# Patient Record
Sex: FEMALE | Race: WHITE | NOT HISPANIC OR LATINO | Employment: UNEMPLOYED | URBAN - METROPOLITAN AREA
[De-identification: names, ages, dates, MRNs, and addresses within clinical notes are randomized per-mention and may not be internally consistent; named-entity substitution may affect disease eponyms.]

---
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
## 2019-01-03 ENCOUNTER — OFFICE VISIT (OUTPATIENT)
Dept: OBSTETRICS AND GYNECOLOGY | Facility: CLINIC | Age: 19
End: 2019-01-03
Payer: COMMERCIAL

## 2019-01-03 VITALS
SYSTOLIC BLOOD PRESSURE: 100 MMHG | DIASTOLIC BLOOD PRESSURE: 56 MMHG | WEIGHT: 101 LBS | BODY MASS INDEX: 20.36 KG/M2 | HEIGHT: 59 IN

## 2019-01-03 DIAGNOSIS — R30.0 DYSURIA: ICD-10-CM

## 2019-01-03 DIAGNOSIS — B96.89 BV (BACTERIAL VAGINOSIS): Primary | ICD-10-CM

## 2019-01-03 DIAGNOSIS — N76.0 BV (BACTERIAL VAGINOSIS): Primary | ICD-10-CM

## 2019-01-03 PROCEDURE — 99213 OFFICE O/P EST LOW 20 MIN: CPT | Mod: S$GLB,,, | Performed by: NURSE PRACTITIONER

## 2019-01-03 PROCEDURE — 87480 CANDIDA DNA DIR PROBE: CPT

## 2019-01-03 PROCEDURE — 87510 GARDNER VAG DNA DIR PROBE: CPT

## 2019-01-03 PROCEDURE — 3008F BODY MASS INDEX DOCD: CPT | Mod: CPTII,S$GLB,, | Performed by: NURSE PRACTITIONER

## 2019-01-03 PROCEDURE — 87086 URINE CULTURE/COLONY COUNT: CPT

## 2019-01-03 PROCEDURE — 87088 URINE BACTERIA CULTURE: CPT

## 2019-01-03 PROCEDURE — 99213 PR OFFICE/OUTPT VISIT, EST, LEVL III, 20-29 MIN: ICD-10-PCS | Mod: S$GLB,,, | Performed by: NURSE PRACTITIONER

## 2019-01-03 PROCEDURE — 87147 CULTURE TYPE IMMUNOLOGIC: CPT

## 2019-01-03 PROCEDURE — 99999 PR PBB SHADOW E&M-EST. PATIENT-LVL III: CPT | Mod: PBBFAC,,, | Performed by: NURSE PRACTITIONER

## 2019-01-03 PROCEDURE — 99999 PR PBB SHADOW E&M-EST. PATIENT-LVL III: ICD-10-PCS | Mod: PBBFAC,,, | Performed by: NURSE PRACTITIONER

## 2019-01-03 PROCEDURE — 87491 CHLMYD TRACH DNA AMP PROBE: CPT

## 2019-01-03 PROCEDURE — 3008F PR BODY MASS INDEX (BMI) DOCUMENTED: ICD-10-PCS | Mod: CPTII,S$GLB,, | Performed by: NURSE PRACTITIONER

## 2019-01-03 NOTE — PROGRESS NOTES
"CC: Dysuria and vaginal discharge    Ruslan Perez is a 18 y.o. female  presents dysuria and vaginal discharge.Urine in clinic indicated trace Leucocytes. Patient was treated for BV 2 weeks ago.  LMP: Patient's last menstrual period was 12/10/2018..  No pelvic pain. Patient " has unprotected sex with a new partner ".     Past Medical History:   Diagnosis Date    ADHD (attention deficit hyperactivity disorder)     Anxiety     Depression     History of chlamydia     Lactose intolerance     Migraine headache     Renal disorder     kidney stones    TIA (transient ischemic attack)      Past Surgical History:   Procedure Laterality Date    blood vessel removed from chest      kidney stent      Mirena      placed 2 years ago     Social History     Socioeconomic History    Marital status: Single     Spouse name: Not on file    Number of children: Not on file    Years of education: Not on file    Highest education level: Not on file   Social Needs    Financial resource strain: Not on file    Food insecurity - worry: Not on file    Food insecurity - inability: Not on file    Transportation needs - medical: Not on file    Transportation needs - non-medical: Not on file   Occupational History    Not on file   Tobacco Use    Smoking status: Current Every Day Smoker     Packs/day: 0.50     Types: Cigarettes    Smokeless tobacco: Current User   Substance and Sexual Activity    Alcohol use: No    Drug use: No    Sexual activity: Yes     Partners: Male     Birth control/protection: None   Other Topics Concern    Not on file   Social History Narrative    Not on file     Family History   Problem Relation Age of Onset    Breast cancer Paternal Grandmother     Breast cancer Paternal Aunt     Colon cancer Neg Hx     Ovarian cancer Neg Hx      OB History      Para Term  AB Living    1 1 1 0 0 1    SAB TAB Ectopic Multiple Live Births    0 0 0 0 1          BP (!) 100/56   Ht 4' " "11" (1.499 m)   Wt 45.8 kg (100 lb 15.5 oz)   LMP 12/10/2018   BMI 20.39 kg/m²       ROS:  CARDIOVASCULAR: Denies palpitations or left sided chest pain.   ABDOMEN: No abdominal pain, constipation, diarrhea, nausea, vomiting or rectal bleeding.   URINARY: HPI  REPRODUCTIVE: See HPI.       PHYSICAL EXAM:  APPEARANCE: Well nourished, well developed, in no acute distress.  CHEST: Good respiratory effect  ABDOMEN: Soft.  No tenderness or masses.  No hepatosplenomegaly.  No hernias.  PELVIC: Normal external genitalia without lesions.   Vagina without lesions or discharge.     1. BV (bacterial vaginosis)  C. trachomatis/N. gonorrhoeae by AMP DNA    Vaginosis Screen by DNA Probe   2. Dysuria  Urine culture     PLAN:  Repeat cx  Urine sent for cx    Patient was counseled today on A.C.S. Pap guidelines and recommendations for yearly pelvic exams, mammograms and monthly self breast exams; to see her PCP for other health maintenance.                   "

## 2019-01-03 NOTE — PATIENT INSTRUCTIONS

## 2019-01-04 ENCOUNTER — TELEPHONE (OUTPATIENT)
Dept: OBSTETRICS AND GYNECOLOGY | Facility: CLINIC | Age: 19
End: 2019-01-04

## 2019-01-04 LAB
C TRACH DNA SPEC QL NAA+PROBE: NOT DETECTED
CANDIDA RRNA VAG QL PROBE: NEGATIVE
G VAGINALIS RRNA GENITAL QL PROBE: POSITIVE
N GONORRHOEA DNA SPEC QL NAA+PROBE: NOT DETECTED
T VAGINALIS RRNA GENITAL QL PROBE: NEGATIVE

## 2019-01-04 RX ORDER — CLINDAMYCIN HYDROCHLORIDE 300 MG/1
300 CAPSULE ORAL EVERY 8 HOURS
Qty: 21 CAPSULE | Refills: 0 | Status: SHIPPED | OUTPATIENT
Start: 2019-01-04 | End: 2019-01-11

## 2019-01-04 NOTE — TELEPHONE ENCOUNTER
Spoke with pt  Notified of vaginosis screen, medication instructions give. Gonorrhea and Chlamydia testing is negative. Urine culture is in process. Patient verbalized understanding

## 2019-01-04 NOTE — TELEPHONE ENCOUNTER
----- Message from Aissatou Mccormack NP sent at 1/4/2019  7:49 AM CST -----  Please call patient and inform her that cx indicated BV. I called in [place].

## 2019-01-05 LAB — BACTERIA UR CULT: NORMAL

## 2019-01-10 ENCOUNTER — TELEPHONE (OUTPATIENT)
Dept: OBSTETRICS AND GYNECOLOGY | Facility: CLINIC | Age: 19
End: 2019-01-10

## 2019-01-10 RX ORDER — PROMETHAZINE HYDROCHLORIDE 25 MG/1
25 TABLET ORAL EVERY 6 HOURS PRN
Qty: 8 TABLET | Refills: 0 | Status: SHIPPED | OUTPATIENT
Start: 2019-01-10 | End: 2019-01-13

## 2019-01-10 NOTE — TELEPHONE ENCOUNTER
----- Message from Gladys Munguia sent at 1/10/2019  9:00 AM CST -----  Pt is requesting a prescription for nausea due to the antibiotics she is taking.          Please call pt back at [phone]          [id number] - [place] - [address] AT [facility]  [address]  [place] [address]  Phone: [phone] Fax: [phone]

## 2019-08-06 ENCOUNTER — OFFICE VISIT (OUTPATIENT)
Dept: OBSTETRICS AND GYNECOLOGY | Facility: CLINIC | Age: 19
End: 2019-08-06
Payer: COMMERCIAL

## 2019-08-06 ENCOUNTER — LAB VISIT (OUTPATIENT)
Dept: LAB | Facility: HOSPITAL | Age: 19
End: 2019-08-06
Attending: OBSTETRICS & GYNECOLOGY
Payer: COMMERCIAL

## 2019-08-06 VITALS
SYSTOLIC BLOOD PRESSURE: 98 MMHG | DIASTOLIC BLOOD PRESSURE: 62 MMHG | HEIGHT: 59 IN | BODY MASS INDEX: 20.89 KG/M2 | WEIGHT: 103.63 LBS

## 2019-08-06 DIAGNOSIS — Z11.3 SCREEN FOR STD (SEXUALLY TRANSMITTED DISEASE): ICD-10-CM

## 2019-08-06 DIAGNOSIS — Z11.3 SCREEN FOR STD (SEXUALLY TRANSMITTED DISEASE): Primary | ICD-10-CM

## 2019-08-06 PROBLEM — F17.200 TOBACCO DEPENDENCE SYNDROME: Status: ACTIVE | Noted: 2018-05-01

## 2019-08-06 PROBLEM — F32.A DEPRESSIVE DISORDER: Status: ACTIVE | Noted: 2018-05-01

## 2019-08-06 PROCEDURE — 3008F PR BODY MASS INDEX (BMI) DOCUMENTED: ICD-10-PCS | Mod: CPTII,S$GLB,, | Performed by: OBSTETRICS & GYNECOLOGY

## 2019-08-06 PROCEDURE — 99999 PR PBB SHADOW E&M-EST. PATIENT-LVL II: CPT | Mod: PBBFAC,,, | Performed by: OBSTETRICS & GYNECOLOGY

## 2019-08-06 PROCEDURE — 99999 PR PBB SHADOW E&M-EST. PATIENT-LVL II: ICD-10-PCS | Mod: PBBFAC,,, | Performed by: OBSTETRICS & GYNECOLOGY

## 2019-08-06 PROCEDURE — 87210 SMEAR WET MOUNT SALINE/INK: CPT | Mod: QW,S$GLB,, | Performed by: OBSTETRICS & GYNECOLOGY

## 2019-08-06 PROCEDURE — 86703 HIV-1/HIV-2 1 RESULT ANTBDY: CPT

## 2019-08-06 PROCEDURE — 86592 SYPHILIS TEST NON-TREP QUAL: CPT

## 2019-08-06 PROCEDURE — 36415 COLL VENOUS BLD VENIPUNCTURE: CPT

## 2019-08-06 PROCEDURE — 99213 PR OFFICE/OUTPT VISIT, EST, LEVL III, 20-29 MIN: ICD-10-PCS | Mod: S$GLB,,, | Performed by: OBSTETRICS & GYNECOLOGY

## 2019-08-06 PROCEDURE — 87491 CHLMYD TRACH DNA AMP PROBE: CPT

## 2019-08-06 PROCEDURE — 3008F BODY MASS INDEX DOCD: CPT | Mod: CPTII,S$GLB,, | Performed by: OBSTETRICS & GYNECOLOGY

## 2019-08-06 PROCEDURE — 99213 OFFICE O/P EST LOW 20 MIN: CPT | Mod: S$GLB,,, | Performed by: OBSTETRICS & GYNECOLOGY

## 2019-08-06 PROCEDURE — 87210 PR  SMEAR,STAIN,WET MNT,INTERP: ICD-10-PCS | Mod: QW,S$GLB,, | Performed by: OBSTETRICS & GYNECOLOGY

## 2019-08-06 PROCEDURE — 80074 ACUTE HEPATITIS PANEL: CPT

## 2019-08-06 NOTE — PROGRESS NOTES
Subjective:       Patient ID: Ruslan Perez is a 19 y.o. female.    Chief Complaint:  STD CHECK      History of Present Illness  HPI  Unprotected intercourse.   Concerned about std exposure, partner with no history   Also with some concern about change in vaginal discharge.  No other symptoms from the discharge.   History of BV in the past   No recent positive std     Health Maintenance   Topic Date Due    Lipid Panel  1999    Pneumococcal Vaccine (Medium Risk) (1 of 1 - PPSV23) 2018    CHLAMYDIA SCREENING  2020    TETANUS VACCINE  2021    HPV Vaccines  Completed     GYN & OB History  Patient's last menstrual period was 2019.   Date of Last Pap: No result found    OB History    Para Term  AB Living   1 1 1 0 0 1   SAB TAB Ectopic Multiple Live Births   0 0 0 0 1      # Outcome Date GA Lbr Marcel/2nd Weight Sex Delivery Anes PTL Lv   1 Term 17    M Vag-Spont EPI N JONATAN       Review of Systems  Review of Systems        Objective:   Physical Exam:             Abdominal: Soft. Bowel sounds are normal. She exhibits no distension, no mass and no abdominal incision. There is no tenderness. There is no guarding. No hernia. Hernia confirmed negative in the right inguinal area and confirmed negative in the left inguinal area.     Genitourinary: Rectum normal, vagina normal and uterus normal. There is no rash, tenderness or lesion on the right labia. There is no rash, tenderness or lesion on the left labia. Uterus is not deviated, not enlarged, not tender, not hosting fibroids and not experiencing uterine prolapse. Cervix is normal. Right adnexum displays no mass, no tenderness and no fullness. Left adnexum displays no mass, no tenderness and no fullness. No tenderness, bleeding, rectocele, cystocele or unspecified prolapse of vaginal walls in the vagina. No foreign body in the vagina. No signs of injury around the vagina. No vaginal discharge found. Cervix exhibits  no motion tenderness, no discharge and no friability.              Lymphadenopathy:        Right: No inguinal adenopathy present.        Left: No inguinal adenopathy present.      DNA probe done of the cervix   WET PREP   No abnormal findings.  Lack of Lactobacilli      Assessment:        1. Screen for STD (sexually transmitted disease)                Plan:            Ruslan was seen today for std check.    Diagnoses and all orders for this visit:    Screen for STD (sexually transmitted disease)  -     C. trachomatis/N. gonorrhoeae by AMP DNA  -     HIV 1/2 Ag/Ab (4th Gen); Future  -     RPR; Future  -     Hepatitis panel, acute; Future

## 2019-08-07 LAB
C TRACH DNA SPEC QL NAA+PROBE: NOT DETECTED
HAV IGM SERPL QL IA: NEGATIVE
HBV CORE IGM SERPL QL IA: NEGATIVE
HBV SURFACE AG SERPL QL IA: NEGATIVE
HCV AB SERPL QL IA: NEGATIVE
HIV 1+2 AB+HIV1 P24 AG SERPL QL IA: NEGATIVE
N GONORRHOEA DNA SPEC QL NAA+PROBE: NOT DETECTED
RPR SER QL: NORMAL

## 2019-08-31 ENCOUNTER — NURSE TRIAGE (OUTPATIENT)
Dept: ADMINISTRATIVE | Facility: CLINIC | Age: 19
End: 2019-08-31

## 2019-08-31 NOTE — TELEPHONE ENCOUNTER
Reason for Disposition   Side (flank) or lower back pain present    Additional Information   Negative: Shock suspected (e.g., cold/pale/clammy skin, too weak to stand, low BP, rapid pulse)   Negative: Sounds like a life-threatening emergency to the triager   Negative: Followed a genital area injury   Negative: Taking antibiotic for urinary tract infection (UTI)   Negative: Pregnant   Negative: Postpartum < 1 month   Negative: [1] Unable to urinate (or only a few drops) > 4 hours AND     [2] bladder feels very full (e.g., palpable bladder or strong urge to urinate)   Negative: Patient sounds very sick or weak to the triager   Negative: [1] SEVERE pain with urination  (e.g., excruciating) AND [2] not improved after 2 hours of pain medicine and Sitz bath   Negative: Fever > 100.5 F (38.1 C)    Protocols used: ST URINATION PAIN - FEMALE-A-AH    Pt stated she have 7/10 pain when urinating and the she has flank pain. Stated he has stents in her Kidneys and  knows she has a UTI. Advised to see Physician within 4 hours(Urgent care) per protocol.

## 2019-11-21 ENCOUNTER — LAB VISIT (OUTPATIENT)
Dept: LAB | Facility: HOSPITAL | Age: 19
End: 2019-11-21
Attending: OBSTETRICS & GYNECOLOGY
Payer: COMMERCIAL

## 2019-11-21 ENCOUNTER — OFFICE VISIT (OUTPATIENT)
Dept: OBSTETRICS AND GYNECOLOGY | Facility: CLINIC | Age: 19
End: 2019-11-21
Payer: COMMERCIAL

## 2019-11-21 VITALS
DIASTOLIC BLOOD PRESSURE: 78 MMHG | SYSTOLIC BLOOD PRESSURE: 110 MMHG | WEIGHT: 102.31 LBS | HEIGHT: 59 IN | BODY MASS INDEX: 20.63 KG/M2

## 2019-11-21 DIAGNOSIS — N92.1 IRREGULAR INTERMENSTRUAL BLEEDING: ICD-10-CM

## 2019-11-21 DIAGNOSIS — N92.1 IRREGULAR INTERMENSTRUAL BLEEDING: Primary | ICD-10-CM

## 2019-11-21 LAB
BASOPHILS # BLD AUTO: 0.01 K/UL (ref 0–0.2)
BASOPHILS NFR BLD: 0.2 % (ref 0–1.9)
DIFFERENTIAL METHOD: NORMAL
EOSINOPHIL # BLD AUTO: 0 K/UL (ref 0–0.5)
EOSINOPHIL NFR BLD: 0.7 % (ref 0–8)
ERYTHROCYTE [DISTWIDTH] IN BLOOD BY AUTOMATED COUNT: 12.7 % (ref 11.5–14.5)
HCT VFR BLD AUTO: 43.1 % (ref 37–48.5)
HGB BLD-MCNC: 13.8 G/DL (ref 12–16)
IMM GRANULOCYTES # BLD AUTO: 0.01 K/UL (ref 0–0.04)
IMM GRANULOCYTES NFR BLD AUTO: 0.2 % (ref 0–0.5)
LYMPHOCYTES # BLD AUTO: 1.3 K/UL (ref 1–4.8)
LYMPHOCYTES NFR BLD: 28.6 % (ref 18–48)
MCH RBC QN AUTO: 30.3 PG (ref 27–31)
MCHC RBC AUTO-ENTMCNC: 32 G/DL (ref 32–36)
MCV RBC AUTO: 95 FL (ref 82–98)
MONOCYTES # BLD AUTO: 0.4 K/UL (ref 0.3–1)
MONOCYTES NFR BLD: 8.7 % (ref 4–15)
NEUTROPHILS # BLD AUTO: 2.8 K/UL (ref 1.8–7.7)
NEUTROPHILS NFR BLD: 61.6 % (ref 38–73)
NRBC BLD-RTO: 0 /100 WBC
PLATELET # BLD AUTO: 218 K/UL (ref 150–350)
PMV BLD AUTO: 12.1 FL (ref 9.2–12.9)
RBC # BLD AUTO: 4.56 M/UL (ref 4–5.4)
T4 FREE SERPL-MCNC: 1.11 NG/DL (ref 0.71–1.51)
TSH SERPL DL<=0.005 MIU/L-ACNC: 0.94 UIU/ML (ref 0.4–4)
WBC # BLD AUTO: 4.58 K/UL (ref 3.9–12.7)

## 2019-11-21 PROCEDURE — 84439 ASSAY OF FREE THYROXINE: CPT

## 2019-11-21 PROCEDURE — 99999 PR PBB SHADOW E&M-EST. PATIENT-LVL III: ICD-10-PCS | Mod: PBBFAC,,, | Performed by: OBSTETRICS & GYNECOLOGY

## 2019-11-21 PROCEDURE — 87801 DETECT AGNT MULT DNA AMPLI: CPT

## 2019-11-21 PROCEDURE — 84443 ASSAY THYROID STIM HORMONE: CPT

## 2019-11-21 PROCEDURE — 99214 PR OFFICE/OUTPT VISIT, EST, LEVL IV, 30-39 MIN: ICD-10-PCS | Mod: S$GLB,,, | Performed by: OBSTETRICS & GYNECOLOGY

## 2019-11-21 PROCEDURE — 87661 TRICHOMONAS VAGINALIS AMPLIF: CPT

## 2019-11-21 PROCEDURE — 3008F BODY MASS INDEX DOCD: CPT | Mod: CPTII,S$GLB,, | Performed by: OBSTETRICS & GYNECOLOGY

## 2019-11-21 PROCEDURE — 36415 COLL VENOUS BLD VENIPUNCTURE: CPT

## 2019-11-21 PROCEDURE — 87481 CANDIDA DNA AMP PROBE: CPT | Mod: 59

## 2019-11-21 PROCEDURE — 3008F PR BODY MASS INDEX (BMI) DOCUMENTED: ICD-10-PCS | Mod: CPTII,S$GLB,, | Performed by: OBSTETRICS & GYNECOLOGY

## 2019-11-21 PROCEDURE — 99214 OFFICE O/P EST MOD 30 MIN: CPT | Mod: S$GLB,,, | Performed by: OBSTETRICS & GYNECOLOGY

## 2019-11-21 PROCEDURE — 85025 COMPLETE CBC W/AUTO DIFF WBC: CPT

## 2019-11-21 PROCEDURE — 99999 PR PBB SHADOW E&M-EST. PATIENT-LVL III: CPT | Mod: PBBFAC,,, | Performed by: OBSTETRICS & GYNECOLOGY

## 2019-11-21 RX ORDER — TACROLIMUS 0.3 MG/G
OINTMENT TOPICAL
COMMUNITY
End: 2020-11-04

## 2019-11-21 NOTE — PROGRESS NOTES
Subjective:       Patient ID: Ruslan Perez is a 19 y.o. female.    Chief Complaint:  Metrorrhagia      History of Present Illness  Patient has been bleeding for 3 weeks.  Patient stated that one night 3 weeks ago, when she urinated, had a red streak in her vaginal discharge. Patient had intercourse that night.  Patient started bleeding the next night.  Patient had just finished her period before the intercourse.  It was heavy for over a week. Patient had cramps during that time.  t  Patient stated that it was clumpy and clotty. .  Never completely went away, became brown, then very light.  Had sex last night, now she is bleeding now, very heavy.  Patient is not on any contraception.  Patient usually starts her period since 17.  No pain during sexual intercourse.              GYN & OB History  Patient's last menstrual period was 10/25/2019.   Date of Last Pap: No result found    OB History    Para Term  AB Living   1 1 1 0 0 1   SAB TAB Ectopic Multiple Live Births   0 0 0 0 1      # Outcome Date GA Lbr Marcel/2nd Weight Sex Delivery Anes PTL Lv   1 Term 17    M Vag-Spont EPI N JONATAN       Review of Systems      Review of Systems   Constitutional: Negative.    HENT: Negative.    Eyes: Negative.    Respiratory: Negative.    Cardiovascular: Negative.    Gastrointestinal: Negative.    Endocrine: Negative.    Genitourinary: Negative.    Musculoskeletal: Negative.    Skin: Negative.    Allergic/Immunologic: Negative.    Neurological: Negative.    Hematological: Negative.    Psychiatric/Behavioral: Negative.       Objective:    Physical Exam:   Constitutional: She is oriented to person, place, and time. She appears well-developed and well-nourished.     Eyes: EOM are normal.    Neck: Normal range of motion. Neck supple.    Cardiovascular: Normal heart sounds.     Pulmonary/Chest: Breath sounds normal.        Abdominal: Soft. Bowel sounds are normal. Hernia confirmed negative in the right  inguinal area and confirmed negative in the left inguinal area.     Genitourinary: Rectum normal and vagina normal. Pelvic exam was performed with patient supine. Cervix is normal. Right adnexum displays no mass, no tenderness and no fullness. Left adnexum displays no mass, no tenderness and no fullness. Labial bartholins normal.          Musculoskeletal: Normal range of motion.      Lymphadenopathy:        Right: No inguinal adenopathy present.        Left: No inguinal adenopathy present.    Neurological: She is alert and oriented to person, place, and time. She has normal reflexes.    Skin: Skin is warm and dry.    Psychiatric: She has a normal mood and affect.          Assessment:        1. Irregular intermenstrual bleeding               Plan:   Irregular intermenstrual bleeding  -     Vaginosis Screen by DNA Probe  -     US Pelvis Comp with Transvag NON-OB (xpd; Future; Expected date: 11/21/2019  -     TSH; Future; Expected date: 11/21/2019  -     T4, free; Future; Expected date: 11/21/2019  -     CBC auto differential; Future; Expected date: 11/21/2019       Follow up if symptoms worsen or fail to improve.

## 2019-11-25 ENCOUNTER — TELEPHONE (OUTPATIENT)
Dept: OBSTETRICS AND GYNECOLOGY | Facility: CLINIC | Age: 19
End: 2019-11-25

## 2019-11-25 LAB
BACTERIAL VAGINOSIS DNA: NEGATIVE
CANDIDA GLABRATA DNA: NEGATIVE
CANDIDA KRUSEI DNA: NEGATIVE
CANDIDA RRNA VAG QL PROBE: NEGATIVE
T VAGINALIS RRNA GENITAL QL PROBE: NEGATIVE

## 2019-11-25 NOTE — TELEPHONE ENCOUNTER
----- Message from Marcy Morel sent at 11/25/2019 11:15 AM CST -----  Contact: [phone]  Would like to consult with the nurse, Patient would like to get her Test Result, Please call back at   .Type:  Test Results    Who Called:  Ms Perez  Name of Test (Lab/Mammo/Etc):   Date of Test:  Nov 20, 2019  Ordering Provider: Dr Ochoa  Where the test was performed: [facility]  Would the patient rather a call back or a response via [facility]? CallBack  Best Call Back Number: [phone]  Additional Information:

## 2019-11-25 NOTE — TELEPHONE ENCOUNTER
----- Message from Becca Barnes sent at 11/25/2019  5:00 PM CST -----  Contact: pt  Pt was returning missed call     Pt can be reached at [phone]

## 2019-11-26 ENCOUNTER — TELEPHONE (OUTPATIENT)
Dept: OBSTETRICS AND GYNECOLOGY | Facility: CLINIC | Age: 19
End: 2019-11-26

## 2019-11-26 NOTE — TELEPHONE ENCOUNTER
----- Message from Klaudia Naik sent at 11/26/2019  1:15 PM CST -----  Contact: Patient  .Type:  Patient Returning Call    Who Called: Patient  Who Left Message for Patient: Radha  Does the patient know what this is regarding?:  Would the patient rather a call back or a response via [employer]? Call  Best Call Back Number:.[phone]  Additional Information:

## 2019-12-10 ENCOUNTER — TELEPHONE (OUTPATIENT)
Dept: RADIOLOGY | Facility: HOSPITAL | Age: 19
End: 2019-12-10

## 2019-12-11 ENCOUNTER — HOSPITAL ENCOUNTER (OUTPATIENT)
Dept: RADIOLOGY | Facility: HOSPITAL | Age: 19
Discharge: HOME | End: 2019-12-11
Attending: OBSTETRICS & GYNECOLOGY
Payer: COMMERCIAL

## 2019-12-11 DIAGNOSIS — N92.1 IRREGULAR INTERMENSTRUAL BLEEDING: ICD-10-CM

## 2020-02-19 ENCOUNTER — LAB VISIT (OUTPATIENT)
Dept: LAB | Facility: HOSPITAL | Age: 20
End: 2020-02-19
Attending: OBSTETRICS & GYNECOLOGY
Payer: MEDICAID

## 2020-02-19 ENCOUNTER — OFFICE VISIT (OUTPATIENT)
Dept: OBSTETRICS AND GYNECOLOGY | Facility: CLINIC | Age: 20
End: 2020-02-19
Payer: MEDICAID

## 2020-02-19 VITALS
HEIGHT: 59 IN | BODY MASS INDEX: 20.05 KG/M2 | SYSTOLIC BLOOD PRESSURE: 106 MMHG | WEIGHT: 99.44 LBS | DIASTOLIC BLOOD PRESSURE: 60 MMHG

## 2020-02-19 DIAGNOSIS — Z11.3 SCREEN FOR STD (SEXUALLY TRANSMITTED DISEASE): Primary | ICD-10-CM

## 2020-02-19 DIAGNOSIS — Z11.3 SCREEN FOR STD (SEXUALLY TRANSMITTED DISEASE): ICD-10-CM

## 2020-02-19 PROCEDURE — 86592 SYPHILIS TEST NON-TREP QUAL: CPT

## 2020-02-19 PROCEDURE — 99213 PR OFFICE/OUTPT VISIT, EST, LEVL III, 20-29 MIN: ICD-10-PCS | Mod: S$PBB,,, | Performed by: OBSTETRICS & GYNECOLOGY

## 2020-02-19 PROCEDURE — 99213 OFFICE O/P EST LOW 20 MIN: CPT | Mod: S$PBB,,, | Performed by: OBSTETRICS & GYNECOLOGY

## 2020-02-19 PROCEDURE — 87591 N.GONORRHOEAE DNA AMP PROB: CPT

## 2020-02-19 PROCEDURE — 86703 HIV-1/HIV-2 1 RESULT ANTBDY: CPT

## 2020-02-19 PROCEDURE — 99213 OFFICE O/P EST LOW 20 MIN: CPT | Mod: PBBFAC | Performed by: OBSTETRICS & GYNECOLOGY

## 2020-02-19 PROCEDURE — 99999 PR PBB SHADOW E&M-EST. PATIENT-LVL III: CPT | Mod: PBBFAC,,, | Performed by: OBSTETRICS & GYNECOLOGY

## 2020-02-19 PROCEDURE — 99999 PR PBB SHADOW E&M-EST. PATIENT-LVL III: ICD-10-PCS | Mod: PBBFAC,,, | Performed by: OBSTETRICS & GYNECOLOGY

## 2020-02-19 PROCEDURE — 80074 ACUTE HEPATITIS PANEL: CPT

## 2020-02-19 PROCEDURE — 36415 COLL VENOUS BLD VENIPUNCTURE: CPT

## 2020-02-19 NOTE — PROGRESS NOTES
Subjective:       Patient ID: Ruslan Perez is a 19 y.o. female.    Chief Complaint:  STD CHECK      History of Present Illness  HPI  Concerned about exposure   No significant pelvic complaints   History of chlamydia in distant past with multiple negative screens   Urine    Negative in clinic      upt negative.     Health Maintenance   Topic Date Due    Lipid Panel  1999    Pneumococcal Vaccine (Medium Risk) (1 of 1 - PPSV23) 2018    Chlamydia Screening  2020    TETANUS VACCINE  2021    HPV Vaccines  Completed     GYN & OB History  Patient's last menstrual period was 2020 (within days).   Date of Last Pap: No result found    OB History    Para Term  AB Living   1 1 1 0 0 1   SAB TAB Ectopic Multiple Live Births   0 0 0 0 1      # Outcome Date GA Lbr Marcel/2nd Weight Sex Delivery Anes PTL Lv   1 Term 17    M Vag-Spont EPI N JONATAN       Review of Systems  Review of Systems        Objective:   Physical Exam:             Abdominal: Soft. Bowel sounds are normal. She exhibits no distension, no mass and no abdominal incision. There is no tenderness. There is no guarding. No hernia. Hernia confirmed negative in the right inguinal area and confirmed negative in the left inguinal area.     Genitourinary: Rectum normal, vagina normal and uterus normal. There is no rash, tenderness or lesion on the right labia. There is no rash, tenderness or lesion on the left labia. Uterus is not deviated, not enlarged, not tender, not hosting fibroids and not experiencing uterine prolapse. Cervix is normal. Right adnexum displays no mass, no tenderness and no fullness. Left adnexum displays no mass, no tenderness and no fullness. No tenderness, bleeding, rectocele, cystocele or unspecified prolapse of vaginal walls in the vagina. No foreign body in the vagina. No signs of injury around the vagina. No vaginal discharge found. Cervix exhibits no motion tenderness, no discharge and  no friability.              Lymphadenopathy:        Right: No inguinal adenopathy present.        Left: No inguinal adenopathy present.         DNA probe done of the cervix     Assessment:        1. Screen for STD (sexually transmitted disease)                Plan:            Ruslan was seen today for std check.    Diagnoses and all orders for this visit:    Screen for STD (sexually transmitted disease)  -     C. trachomatis/N. gonorrhoeae by AMP DNA  -     HIV 1/2 Ag/Ab (4th Gen); Future  -     RPR; Future  -     Hepatitis Panel, Acute; Future

## 2020-03-22 ENCOUNTER — NURSE TRIAGE (OUTPATIENT)
Dept: ADMINISTRATIVE | Facility: CLINIC | Age: 20
End: 2020-03-22

## 2020-03-22 NOTE — TELEPHONE ENCOUNTER
Pts mother called today with concern for exposure to Covid 19 for her daughter. Through further questioning it was found that the pt has no exposure to someone with coughing symptoms, but that person subsequently tested negative for COVID. She is currently experiencing productive cough, N/V/D, fever and malaise since yesterday. There is no blood in the diarrhea or vomit. The fever was last measured at 99 today and had a tmax of 101 yesterday. Pts mother informed on COVID exposure, and emergency symptoms. Pts mother opted for home care and urgent care if it worsens    Reason for Disposition   Productive cough is the main symptom   Cough with cold symptoms (e.g., runny nose, postnasal drip, throat clearing)    Additional Information   Negative: Severe difficulty breathing (struggling for each breath, making grunting noises with each breath, unable to speak or cry because of difficulty breathing, severe retractions)   Negative: Sounds like a life-threatening emergency to the triager   Negative: Severe difficulty breathing (e.g., struggling for each breath, speaks in single words)   Negative: Sounds like a life-threatening emergency to the triager   Negative: Throat culture results, call about   Negative: Bluish (or gray) lips or face   Negative: Severe difficulty breathing (e.g., struggling for each breath, speaks in single words)   Negative: Rapid onset of cough and has hives   Negative: Coughing started suddenly after medicine, an allergic food or bee sting   Negative: Difficulty breathing after exposure to flames, smoke, or fumes   Negative: Sounds like a life-threatening emergency to the triager   Negative: Previous asthma attacks and this feels like asthma attack   Negative: Chest pain present when not coughing   Negative: Difficulty breathing   Negative: Passed out (i.e., fainted, collapsed and was not responding)   Negative: Patient sounds very sick or weak to the triager   Negative: Coughed  up > 1 tablespoon (15 ml) blood (Exception: blood-tinged sputum)   Negative: Fever > 100.0 F (37.8 C) and has diabetes mellitus or a weak immune system (e.g., HIV positive, cancer chemotherapy, organ transplant, splenectomy, chronic steroids)   Negative: Fever > 103 F (39.4 C)   Negative: Fever > 101 F (38.3 C) and over 60 years of age   Negative: Fever > 100.0 F (37.8 C) and bedridden (e.g., nursing home patient, stroke, chronic illness, recovering from surgery)   Negative: Increasing ankle swelling   Negative: Wheezing is present   Negative: SEVERE coughing spells (e.g., whooping sound after coughing, vomiting after coughing)   Negative: Coughing up kitty-colored (reddish-brown) or blood-tinged sputum   Negative: Fever present > 3 days (72 hours)   Negative: Fever returns after gone for over 24 hours and symptoms worse or not improved   Negative: Using nasal washes and pain medicine > 24 hours and sinus pain persists   Negative: Known COPD or other severe lung disease (i.e., bronchiectasis, cystic fibrosis, lung surgery) and worsening symptoms (i.e., increased sputum purulence or amount, increased breathing difficulty)   Negative: Continuous (nonstop) coughing interferes with work or school and no improvement using cough treatment per Care Advice   Negative: Patient wants to be seen   Negative: Cough has been present for > 3 weeks   Negative: Allergy symptoms are also present (e.g., itchy eyes, clear nasal discharge, postnasal drip)   Negative: Nasal discharge present > 10 days   Negative: Exposure to TB (Tuberculosis)   Negative: Taking an ACE Inhibitor medication (e.g., benazepril/LOTENSIN, captopril/CAPOTEN, enalapril/VASOTEC, lisinopril/ZESTRIL)   Negative: Cough with no complications    Protocols used: SORE THROAT-A-OH, COUGH-A-OH, SORE THROAT-P-OH

## 2020-11-03 ENCOUNTER — TELEPHONE (OUTPATIENT)
Dept: OBSTETRICS AND GYNECOLOGY | Facility: CLINIC | Age: 20
End: 2020-11-03

## 2020-11-03 NOTE — TELEPHONE ENCOUNTER
Patient has an appointment on Friday with Teresa so please be sure that she can have access to the stress test that is faxed. He is having them fax it. Please let patient know when the stress test is received.    Svitlana [facility]     Returned pt call and got her scheduled for her annual on 11/4/20 at 9:30am for pap and STD screening at [facility] with Aissatou Mccormack NP. Pt aware that we are unable to do G/C swabs and that we can still do STD testing through blood work. Pt verbalized understanding.

## 2020-11-03 NOTE — TELEPHONE ENCOUNTER
----- Message from Belgica Cruz sent at 11/3/2020  8:32 AM CST -----  Would like to schedule appt to have pap, and std check. Please give a call back at [phone].

## 2020-11-04 ENCOUNTER — LAB VISIT (OUTPATIENT)
Dept: LAB | Facility: HOSPITAL | Age: 20
End: 2020-11-04
Attending: NURSE PRACTITIONER
Payer: MEDICAID

## 2020-11-04 ENCOUNTER — OFFICE VISIT (OUTPATIENT)
Dept: OBSTETRICS AND GYNECOLOGY | Facility: CLINIC | Age: 20
End: 2020-11-04
Payer: MEDICAID

## 2020-11-04 VITALS
SYSTOLIC BLOOD PRESSURE: 100 MMHG | BODY MASS INDEX: 20.79 KG/M2 | DIASTOLIC BLOOD PRESSURE: 66 MMHG | WEIGHT: 102.94 LBS

## 2020-11-04 DIAGNOSIS — Z00.00 PREVENTATIVE HEALTH CARE: ICD-10-CM

## 2020-11-04 DIAGNOSIS — Z01.419 PAP SMEAR, AS PART OF ROUTINE GYNECOLOGICAL EXAMINATION: ICD-10-CM

## 2020-11-04 DIAGNOSIS — Z71.1 CONCERN ABOUT STD IN FEMALE WITHOUT DIAGNOSIS: Primary | ICD-10-CM

## 2020-11-04 DIAGNOSIS — Z71.1 CONCERN ABOUT STD IN FEMALE WITHOUT DIAGNOSIS: ICD-10-CM

## 2020-11-04 DIAGNOSIS — A59.01 TRICHOMONAS VAGINALIS (TV) INFECTION: ICD-10-CM

## 2020-11-04 PROCEDURE — 99395 PR PREVENTIVE VISIT,EST,18-39: ICD-10-PCS | Mod: S$PBB,,, | Performed by: NURSE PRACTITIONER

## 2020-11-04 PROCEDURE — 99999 PR PBB SHADOW E&M-EST. PATIENT-LVL III: CPT | Mod: PBBFAC,,, | Performed by: NURSE PRACTITIONER

## 2020-11-04 PROCEDURE — 99395 PREV VISIT EST AGE 18-39: CPT | Mod: S$PBB,,, | Performed by: NURSE PRACTITIONER

## 2020-11-04 PROCEDURE — 86703 HIV-1/HIV-2 1 RESULT ANTBDY: CPT

## 2020-11-04 PROCEDURE — 99999 PR PBB SHADOW E&M-EST. PATIENT-LVL III: ICD-10-PCS | Mod: PBBFAC,,, | Performed by: NURSE PRACTITIONER

## 2020-11-04 PROCEDURE — 99213 OFFICE O/P EST LOW 20 MIN: CPT | Mod: PBBFAC | Performed by: NURSE PRACTITIONER

## 2020-11-04 PROCEDURE — 88175 CYTOPATH C/V AUTO FLUID REDO: CPT

## 2020-11-04 PROCEDURE — 80074 ACUTE HEPATITIS PANEL: CPT

## 2020-11-04 PROCEDURE — 86592 SYPHILIS TEST NON-TREP QUAL: CPT

## 2020-11-04 PROCEDURE — 36415 COLL VENOUS BLD VENIPUNCTURE: CPT

## 2020-11-04 PROCEDURE — 86696 HERPES SIMPLEX TYPE 2 TEST: CPT

## 2020-11-04 RX ORDER — METRONIDAZOLE 500 MG/1
TABLET ORAL
Qty: 4 TABLET | Refills: 0 | Status: SHIPPED | OUTPATIENT
Start: 2020-11-04 | End: 2020-11-09

## 2020-11-04 RX ORDER — CEFTRIAXONE 250 MG/1
250 INJECTION, POWDER, FOR SOLUTION INTRAMUSCULAR; INTRAVENOUS
Status: COMPLETED | OUTPATIENT
Start: 2020-11-04 | End: 2020-11-04

## 2020-11-04 RX ORDER — FLUOXETINE HYDROCHLORIDE 40 MG/1
40 CAPSULE ORAL DAILY
COMMUNITY
Start: 2020-09-21

## 2020-11-04 RX ORDER — AZITHROMYCIN 500 MG/1
TABLET, FILM COATED ORAL
Qty: 2 TABLET | Refills: 0 | Status: SHIPPED | OUTPATIENT
Start: 2020-11-04 | End: 2020-12-17

## 2020-11-04 RX ORDER — CLONAZEPAM 2 MG/1
2 TABLET ORAL DAILY
COMMUNITY
Start: 2020-10-19 | End: 2023-07-14

## 2020-11-04 RX ORDER — QUETIAPINE FUMARATE 100 MG/1
100 TABLET, FILM COATED ORAL NIGHTLY
COMMUNITY
Start: 2020-09-21

## 2020-11-04 RX ORDER — METRONIDAZOLE 7.5 MG/G
1 GEL VAGINAL NIGHTLY
Qty: 5 APPLICATOR | Refills: 0 | Status: SHIPPED | OUTPATIENT
Start: 2020-11-04 | End: 2020-11-04

## 2020-11-04 RX ADMIN — CEFTRIAXONE SODIUM 250 MG: 250 INJECTION, POWDER, FOR SOLUTION INTRAMUSCULAR; INTRAVENOUS at 10:11

## 2020-11-04 NOTE — PROGRESS NOTES
"CC: Well woman exam    Ruslan Perez is a 20 y.o. female  presents for well woman exam.  LMP: Patient's last menstrual period was 10/22/2020..  No issues, problems, or complaints.Cycles are every  26-28 days, not heavy. Denies pelvic pain. No birth control. Is sexually active, " wants testing".     Past Medical History:   Diagnosis Date    ADHD (attention deficit hyperactivity disorder)     Anxiety     Depression     History of chlamydia     Lactose intolerance     Migraine headache     Renal disorder     kidney stones    TIA (transient ischemic attack)      Past Surgical History:   Procedure Laterality Date    blood vessel removed from chest      BREAST SURGERY  2019    kidney stent      Mirena      placed 2 years ago     Social History     Socioeconomic History    Marital status: Single     Spouse name: Not on file    Number of children: Not on file    Years of education: Not on file    Highest education level: Not on file   Occupational History    Not on file   Social Needs    Financial resource strain: Not on file    Food insecurity     Worry: Not on file     Inability: Not on file    Transportation needs     Medical: Not on file     Non-medical: Not on file   Tobacco Use    Smoking status: Current Some Day Smoker     Packs/day: 0.50     Types: Cigarettes, Vaping with nicotine    Smokeless tobacco: Current User   Substance and Sexual Activity    Alcohol use: No    Drug use: No    Sexual activity: Yes     Partners: Male     Birth control/protection: None   Lifestyle    Physical activity     Days per week: Not on file     Minutes per session: Not on file    Stress: Not on file   Relationships    Social connections     Talks on phone: Not on file     Gets together: Not on file     Attends Shinto service: Not on file     Active member of club or organization: Not on file     Attends meetings of clubs or organizations: Not on file     Relationship status: Not on " file   Other Topics Concern    Not on file   Social History Narrative    Not on file     Family History   Problem Relation Age of Onset    Breast cancer Paternal Grandmother     Breast cancer Paternal Aunt     Colon cancer Neg Hx     Ovarian cancer Neg Hx      OB History        1    Para   1    Term   1       0    AB   0    Living   1       SAB   0    TAB   0    Ectopic   0    Multiple   0    Live Births   1                 /66   Wt 46.7 kg (102 lb 15.3 oz)   LMP 10/22/2020   BMI 20.79 kg/m²       ROS:  GENERAL: Denies weight gain or weight loss. Feeling well overall.   SKIN: Denies rash or lesions.   HEAD: Denies head injury or headache.   NODES: Denies enlarged lymph nodes.   CHEST: Denies chest pain or shortness of breath.   CARDIOVASCULAR: Denies palpitations or left sided chest pain.   ABDOMEN: No abdominal pain, constipation, diarrhea, nausea, vomiting or rectal bleeding.   URINARY: No frequency, dysuria, hematuria, or burning on urination.  REPRODUCTIVE: See HPI.   BREASTS: The patient performs breast self-examination and denies pain, lumps, or nipple discharge.   HEMATOLOGIC: No easy bruisability or excessive bleeding.   MUSCULOSKELETAL: Denies joint pain or swelling.   NEUROLOGIC: Denies syncope or weakness.   PSYCHIATRIC: Denies depression, anxiety or mood swings.    PHYSICAL EXAM:  APPEARANCE: Thin female, in no acute distress.  AFFECT: WNL, alert and oriented x 3  SKIN: No acne or hirsutism  NECK: Neck symmetric without masses or thyromegaly  NODES: No inguinal, cervical, axillary, or femoral lymph node enlargement  CHEST: Good respiratory effect  ABDOMEN: Soft.  No tenderness or masses.  No hepatosplenomegaly.  No hernias.  BREASTS: Symmetrical, no skin changes or visible lesions.  No palpable masses, nipple discharge bilaterally.  PELVIC: Normal external genitalia without lesions.  Normal hair distribution.  Adequate perineal body, normal urethral meatus.  Vagina moist and  well rugated without lesions or discharge.  Cervix pink, without lesions, discharge or tenderness. Bimanual exam shows uterus to be normal size, regular, mobile and nontender.  Adnexa without masses or tenderness.    EXTREMITIES: No edema.    1. Concern about STD in female without diagnosis  Liquid-Based Pap Smear, Screening    RPR    HIV 1/2 Ag/Ab (4th Gen)    Hepatitis Panel, Acute    HSV 1 & 2, IgG    POCT Wet Prep   2. Preventative health care  Liquid-Based Pap Smear, Screening    RPR    HIV 1/2 Ag/Ab (4th Gen)    Hepatitis Panel, Acute    HSV 1 & 2, IgG    POCT Wet Prep   3. Pap smear, as part of routine gynecological examination  Liquid-Based Pap Smear, Screening    RPR    HIV 1/2 Ag/Ab (4th Gen)    Hepatitis Panel, Acute    HSV 1 & 2, IgG    POCT Wet Prep     PLAN:  STD assessment  Pap exam  Wet prep indicated Trich  SAIRA for trich  Patient will be empirically treated for GC and Chlamydia. Unable to test for GC/Chlanydia, national backlog  Patient was counseled today on A.C.S. Pap guidelines and recommendations for yearly pelvic exams, mammograms and monthly self breast exams; to see her PCP for other health maintenance.

## 2020-11-04 NOTE — PROGRESS NOTES
Used two patient identifiers. Verified allergies. Rocephin 250mg IM injection given to right ventrogluteal; patient tolerated well. Asked patient to remain in clinic for 15 minutes to be monitored for adverse reaction. Patient verbalized understanding.

## 2020-11-05 ENCOUNTER — TELEPHONE (OUTPATIENT)
Dept: OBSTETRICS AND GYNECOLOGY | Facility: CLINIC | Age: 20
End: 2020-11-05

## 2020-11-05 LAB
HAV IGM SERPL QL IA: NEGATIVE
HBV CORE IGM SERPL QL IA: NEGATIVE
HBV SURFACE AG SERPL QL IA: NEGATIVE
HCV AB SERPL QL IA: NEGATIVE
HIV 1+2 AB+HIV1 P24 AG SERPL QL IA: NEGATIVE
RPR SER QL: NORMAL

## 2020-11-05 NOTE — TELEPHONE ENCOUNTER
----- Message from Alejandra Monzon sent at 11/5/2020  4:05 PM CST -----  Contact: Chanler  Patient would like a call back regards to medication change because the antibiotic she was recently prescribed she cant hold anything down. May you call her back at [phone].    Thanks  KT

## 2020-11-06 LAB
HSV1 IGG SERPL QL IA: POSITIVE
HSV2 IGG SERPL QL IA: NEGATIVE

## 2020-11-09 ENCOUNTER — TELEPHONE (OUTPATIENT)
Dept: OBSTETRICS AND GYNECOLOGY | Facility: CLINIC | Age: 20
End: 2020-11-09

## 2020-11-09 DIAGNOSIS — A59.01 TRICHOMONAS VAGINALIS (TV) INFECTION: Primary | ICD-10-CM

## 2020-11-09 RX ORDER — TINIDAZOLE 500 MG/1
2 TABLET ORAL ONCE
Qty: 4 TABLET | Refills: 0 | Status: SHIPPED | OUTPATIENT
Start: 2020-11-09 | End: 2020-11-10

## 2020-11-09 NOTE — TELEPHONE ENCOUNTER
----- Message from aNe Morris sent at 11/7/2020 12:57 PM CST -----  Name Of Caller:  Ruslan     Provider Name: Aissatou Mccormack,    Does patient feel the need to be seen today? No     Relationship to the Pt?:  Patient     Contact Preference?: [phone]    What is the nature of the call?: Patient had a missed call from your office and would like a call back please

## 2020-11-09 NOTE — TELEPHONE ENCOUNTER
Returned call to patient.  She request that a different medication be submitted to [facility] on [address], or that she receive an injection instead of taking Flagyl 500 mg and azithromycin 500 mg, per patient.  Says that she has only taken 2 of Flagyl 500 mg tablets, due to not being able to tolerate the taste and gagging.  She hasn't taken azithromycin at all, per patient.  Made her aware that a message will be forwarded to the provider, and encouraged her to allow time for a response, she verbalized understanding.

## 2020-12-11 LAB
FINAL PATHOLOGIC DIAGNOSIS: NORMAL
Lab: NORMAL

## 2020-12-17 ENCOUNTER — OFFICE VISIT (OUTPATIENT)
Dept: OBSTETRICS AND GYNECOLOGY | Facility: CLINIC | Age: 20
End: 2020-12-17
Payer: MEDICAID

## 2020-12-17 VITALS
WEIGHT: 103.81 LBS | DIASTOLIC BLOOD PRESSURE: 78 MMHG | SYSTOLIC BLOOD PRESSURE: 110 MMHG | BODY MASS INDEX: 20.97 KG/M2

## 2020-12-17 DIAGNOSIS — R10.2 PELVIC PAIN IN FEMALE: ICD-10-CM

## 2020-12-17 DIAGNOSIS — A59.01 TRICHOMONAS VAGINALIS (TV) INFECTION: Primary | ICD-10-CM

## 2020-12-17 PROCEDURE — 99999 PR PBB SHADOW E&M-EST. PATIENT-LVL III: CPT | Mod: PBBFAC,,, | Performed by: NURSE PRACTITIONER

## 2020-12-17 PROCEDURE — 87086 URINE CULTURE/COLONY COUNT: CPT

## 2020-12-17 PROCEDURE — 99999 PR PBB SHADOW E&M-EST. PATIENT-LVL III: ICD-10-PCS | Mod: PBBFAC,,, | Performed by: NURSE PRACTITIONER

## 2020-12-17 PROCEDURE — 99213 OFFICE O/P EST LOW 20 MIN: CPT | Mod: PBBFAC | Performed by: NURSE PRACTITIONER

## 2020-12-17 PROCEDURE — 99215 PR OFFICE/OUTPT VISIT, EST, LEVL V, 40-54 MIN: ICD-10-PCS | Mod: S$PBB,,, | Performed by: NURSE PRACTITIONER

## 2020-12-17 PROCEDURE — 99215 OFFICE O/P EST HI 40 MIN: CPT | Mod: S$PBB,,, | Performed by: NURSE PRACTITIONER

## 2020-12-17 RX ORDER — ZONISAMIDE 100 MG/1
CAPSULE ORAL
COMMUNITY
Start: 2020-12-14 | End: 2022-03-25

## 2020-12-17 RX ORDER — ONDANSETRON HYDROCHLORIDE 8 MG/1
TABLET, FILM COATED ORAL
COMMUNITY
End: 2022-03-25

## 2020-12-17 NOTE — PROGRESS NOTES
CC: SAIRA Trich    Ruslan Veronique Perez is a 20 y.o. female  presents for SAIRA Trich. LMP: Patient's last menstrual period was 2020..  Patient completed treatment. Patient reports mild dysuria and pelvic pain for several days. No fever, flank pain, hematuria. Urine in clinic was negative.     Past Medical History:   Diagnosis Date    ADHD (attention deficit hyperactivity disorder)     Anxiety     Depression     History of chlamydia     Lactose intolerance     Migraine headache     Renal disorder     kidney stones    TIA (transient ischemic attack)      Past Surgical History:   Procedure Laterality Date    blood vessel removed from chest      BREAST SURGERY  2019    kidney stent      Mirena      placed 2 years ago     Social History     Socioeconomic History    Marital status: Single     Spouse name: Not on file    Number of children: Not on file    Years of education: Not on file    Highest education level: Not on file   Occupational History    Not on file   Social Needs    Financial resource strain: Not on file    Food insecurity     Worry: Not on file     Inability: Not on file    Transportation needs     Medical: Not on file     Non-medical: Not on file   Tobacco Use    Smoking status: Current Some Day Smoker     Packs/day: 0.50     Types: Vaping with nicotine    Smokeless tobacco: Current User   Substance and Sexual Activity    Alcohol use: No    Drug use: No    Sexual activity: Yes     Partners: Male     Birth control/protection: None   Lifestyle    Physical activity     Days per week: Not on file     Minutes per session: Not on file    Stress: Not on file   Relationships    Social connections     Talks on phone: Not on file     Gets together: Not on file     Attends Congregational service: Not on file     Active member of club or organization: Not on file     Attends meetings of clubs or organizations: Not on file     Relationship status: Not on file   Other Topics  Concern    Not on file   Social History Narrative    Not on file     Family History   Problem Relation Age of Onset    Breast cancer Paternal Grandmother     Breast cancer Paternal Aunt     Colon cancer Neg Hx     Ovarian cancer Neg Hx      OB History        1    Para   1    Term   1       0    AB   0    Living   1       SAB   0    TAB   0    Ectopic   0    Multiple   0    Live Births   1                 /78   Wt 47.1 kg (103 lb 13.4 oz)   LMP 2020   BMI 20.97 kg/m²       ROS:  CARDIOVASCULAR: Denies palpitations or left sided chest pain.   ABDOMEN: HPI  URINARY: HPI  REPRODUCTIVE: See HPI.     PHYSICAL EXAM:  APPEARANCE: Well nourished, well developed, in no acute distress.  AFFECT: WNL, alert and oriented x 3  ABDOMEN: Soft.  No tenderness or masses.  No CVA tenderness.  PELVIC: Normal external genitalia without lesions. Vagina without lesions or discharge.  Bimanual normal.     1. Trichomonas vaginalis (TV) infection  POCT Wet Prep   2. Pelvic pain in female  Urine culture    POCT URINE DIPSTICK WITHOUT MICROSCOPE    US Pelvis Complete Non OB    PLAN:  Urine cx   Wet prep negative  Pelvic ultrasound

## 2020-12-19 ENCOUNTER — PATIENT MESSAGE (OUTPATIENT)
Dept: OBSTETRICS AND GYNECOLOGY | Facility: CLINIC | Age: 20
End: 2020-12-19

## 2020-12-19 LAB — BACTERIA UR CULT: NORMAL

## 2020-12-21 ENCOUNTER — TELEPHONE (OUTPATIENT)
Dept: OBSTETRICS AND GYNECOLOGY | Facility: CLINIC | Age: 20
End: 2020-12-21

## 2020-12-21 RX ORDER — METRONIDAZOLE 500 MG/1
500 TABLET ORAL 2 TIMES DAILY
Qty: 14 TABLET | Refills: 0 | Status: SHIPPED | OUTPATIENT
Start: 2020-12-21 | End: 2020-12-21

## 2020-12-21 RX ORDER — METRONIDAZOLE 7.5 MG/G
1 GEL VAGINAL NIGHTLY
Qty: 5 APPLICATOR | Refills: 0 | Status: SHIPPED | OUTPATIENT
Start: 2020-12-21 | End: 2020-12-26

## 2021-03-18 ENCOUNTER — HOSPITAL ENCOUNTER (EMERGENCY)
Facility: HOSPITAL | Age: 21
Discharge: HOME | End: 2021-03-19
Attending: EMERGENCY MEDICINE
Payer: MEDICAID

## 2021-03-18 DIAGNOSIS — R07.89 CHEST WALL PAIN: Primary | ICD-10-CM

## 2021-03-18 DIAGNOSIS — R07.9 CHEST PAIN: ICD-10-CM

## 2021-03-18 PROCEDURE — 93010 EKG 12-LEAD: ICD-10-PCS | Mod: ,,, | Performed by: INTERNAL MEDICINE

## 2021-03-18 PROCEDURE — 99284 EMERGENCY DEPT VISIT MOD MDM: CPT | Mod: 25

## 2021-03-18 PROCEDURE — 93010 ELECTROCARDIOGRAM REPORT: CPT | Mod: ,,, | Performed by: INTERNAL MEDICINE

## 2021-03-18 PROCEDURE — 93005 ELECTROCARDIOGRAM TRACING: CPT

## 2021-03-18 RX ORDER — ALPRAZOLAM 0.5 MG/1
TABLET ORAL
COMMUNITY
End: 2022-03-25

## 2021-03-18 RX ORDER — DEXAMETHASONE SODIUM PHOSPHATE 1 MG/ML
SOLUTION/ DROPS OPHTHALMIC
COMMUNITY
End: 2022-03-25 | Stop reason: CLARIF

## 2021-03-19 VITALS
BODY MASS INDEX: 21.6 KG/M2 | DIASTOLIC BLOOD PRESSURE: 65 MMHG | TEMPERATURE: 98 F | HEART RATE: 75 BPM | HEIGHT: 59 IN | SYSTOLIC BLOOD PRESSURE: 97 MMHG | OXYGEN SATURATION: 100 % | RESPIRATION RATE: 16 BRPM | WEIGHT: 107.13 LBS

## 2021-04-28 ENCOUNTER — PATIENT MESSAGE (OUTPATIENT)
Dept: RESEARCH | Facility: HOSPITAL | Age: 21
End: 2021-04-28

## 2021-06-07 ENCOUNTER — HOSPITAL ENCOUNTER (EMERGENCY)
Facility: HOSPITAL | Age: 21
Discharge: HOME | End: 2021-06-07
Attending: EMERGENCY MEDICINE
Payer: MEDICAID

## 2021-06-07 VITALS
TEMPERATURE: 98 F | BODY MASS INDEX: 21.58 KG/M2 | RESPIRATION RATE: 20 BRPM | WEIGHT: 107.06 LBS | OXYGEN SATURATION: 99 % | HEIGHT: 59 IN | HEART RATE: 92 BPM | DIASTOLIC BLOOD PRESSURE: 62 MMHG | SYSTOLIC BLOOD PRESSURE: 98 MMHG

## 2021-06-07 DIAGNOSIS — R42 DIZZINESS: Primary | ICD-10-CM

## 2021-06-07 DIAGNOSIS — Z86.59 HISTORY OF ANXIETY: ICD-10-CM

## 2021-06-07 LAB
ALBUMIN SERPL BCP-MCNC: 4.1 G/DL (ref 3.5–5.2)
ALP SERPL-CCNC: 64 U/L (ref 55–135)
ALT SERPL W/O P-5'-P-CCNC: 18 U/L (ref 10–44)
ANION GAP SERPL CALC-SCNC: 9 MMOL/L (ref 8–16)
AST SERPL-CCNC: 22 U/L (ref 10–40)
BASOPHILS # BLD AUTO: 0.02 K/UL (ref 0–0.2)
BASOPHILS NFR BLD: 0.6 % (ref 0–1.9)
BILIRUB SERPL-MCNC: 0.5 MG/DL (ref 0.1–1)
BUN SERPL-MCNC: 12 MG/DL (ref 6–20)
CALCIUM SERPL-MCNC: 8.9 MG/DL (ref 8.7–10.5)
CHLORIDE SERPL-SCNC: 110 MMOL/L (ref 95–110)
CO2 SERPL-SCNC: 23 MMOL/L (ref 23–29)
CREAT SERPL-MCNC: 0.8 MG/DL (ref 0.5–1.4)
DIFFERENTIAL METHOD: ABNORMAL
EOSINOPHIL # BLD AUTO: 0 K/UL (ref 0–0.5)
EOSINOPHIL NFR BLD: 1.2 % (ref 0–8)
ERYTHROCYTE [DISTWIDTH] IN BLOOD BY AUTOMATED COUNT: 12.1 % (ref 11.5–14.5)
EST. GFR  (AFRICAN AMERICAN): >60 ML/MIN/1.73 M^2
EST. GFR  (NON AFRICAN AMERICAN): >60 ML/MIN/1.73 M^2
GLUCOSE SERPL-MCNC: 86 MG/DL (ref 70–110)
HCT VFR BLD AUTO: 39.3 % (ref 37–48.5)
HGB BLD-MCNC: 13.2 G/DL (ref 12–16)
IMM GRANULOCYTES # BLD AUTO: 0.01 K/UL (ref 0–0.04)
IMM GRANULOCYTES NFR BLD AUTO: 0.3 % (ref 0–0.5)
LYMPHOCYTES # BLD AUTO: 1.3 K/UL (ref 1–4.8)
LYMPHOCYTES NFR BLD: 37.9 % (ref 18–48)
MCH RBC QN AUTO: 29.1 PG (ref 27–31)
MCHC RBC AUTO-ENTMCNC: 33.6 G/DL (ref 32–36)
MCV RBC AUTO: 87 FL (ref 82–98)
MONOCYTES # BLD AUTO: 0.3 K/UL (ref 0.3–1)
MONOCYTES NFR BLD: 9.2 % (ref 4–15)
NEUTROPHILS # BLD AUTO: 1.8 K/UL (ref 1.8–7.7)
NEUTROPHILS NFR BLD: 50.8 % (ref 38–73)
NRBC BLD-RTO: 0 /100 WBC
PLATELET # BLD AUTO: 236 K/UL (ref 150–450)
PMV BLD AUTO: 11.4 FL (ref 9.2–12.9)
POTASSIUM SERPL-SCNC: 3.9 MMOL/L (ref 3.5–5.1)
PROT SERPL-MCNC: 7.5 G/DL (ref 6–8.4)
RBC # BLD AUTO: 4.53 M/UL (ref 4–5.4)
SODIUM SERPL-SCNC: 142 MMOL/L (ref 136–145)
WBC # BLD AUTO: 3.46 K/UL (ref 3.9–12.7)

## 2021-06-07 PROCEDURE — 80053 COMPREHEN METABOLIC PANEL: CPT | Performed by: NURSE PRACTITIONER

## 2021-06-07 PROCEDURE — 85025 COMPLETE CBC W/AUTO DIFF WBC: CPT | Performed by: NURSE PRACTITIONER

## 2021-06-07 PROCEDURE — 93010 ELECTROCARDIOGRAM REPORT: CPT | Mod: ,,, | Performed by: INTERNAL MEDICINE

## 2021-06-07 PROCEDURE — 93005 ELECTROCARDIOGRAM TRACING: CPT

## 2021-06-07 PROCEDURE — 99284 EMERGENCY DEPT VISIT MOD MDM: CPT | Mod: 25

## 2021-06-07 PROCEDURE — 93010 EKG 12-LEAD: ICD-10-PCS | Mod: ,,, | Performed by: INTERNAL MEDICINE

## 2021-08-20 ENCOUNTER — TELEPHONE (OUTPATIENT)
Dept: OBSTETRICS AND GYNECOLOGY | Facility: CLINIC | Age: 21
End: 2021-08-20

## 2022-03-25 ENCOUNTER — HOSPITAL ENCOUNTER (EMERGENCY)
Facility: HOSPITAL | Age: 22
Discharge: HOME | End: 2022-03-25
Attending: EMERGENCY MEDICINE
Payer: MEDICAID

## 2022-03-25 VITALS
HEIGHT: 59 IN | RESPIRATION RATE: 19 BRPM | DIASTOLIC BLOOD PRESSURE: 51 MMHG | OXYGEN SATURATION: 99 % | WEIGHT: 118.38 LBS | SYSTOLIC BLOOD PRESSURE: 93 MMHG | TEMPERATURE: 99 F | HEART RATE: 94 BPM | BODY MASS INDEX: 23.87 KG/M2

## 2022-03-25 DIAGNOSIS — R11.2 NON-INTRACTABLE VOMITING WITH NAUSEA, UNSPECIFIED VOMITING TYPE: Primary | ICD-10-CM

## 2022-03-25 DIAGNOSIS — B34.9 VIRAL SYNDROME: ICD-10-CM

## 2022-03-25 DIAGNOSIS — R19.7 DIARRHEA, UNSPECIFIED TYPE: ICD-10-CM

## 2022-03-25 LAB
ALBUMIN SERPL BCP-MCNC: 3.9 G/DL (ref 3.5–5.2)
ALP SERPL-CCNC: 72 U/L (ref 55–135)
ALT SERPL W/O P-5'-P-CCNC: 17 U/L (ref 10–44)
ANION GAP SERPL CALC-SCNC: 11 MMOL/L (ref 8–16)
AST SERPL-CCNC: 17 U/L (ref 10–40)
B-HCG UR QL: NEGATIVE
BASOPHILS # BLD AUTO: 0.01 K/UL (ref 0–0.2)
BASOPHILS NFR BLD: 0.1 % (ref 0–1.9)
BILIRUB SERPL-MCNC: 1 MG/DL (ref 0.1–1)
BILIRUB UR QL STRIP: NEGATIVE
BUN SERPL-MCNC: 14 MG/DL (ref 6–20)
CALCIUM SERPL-MCNC: 9.1 MG/DL (ref 8.7–10.5)
CHLORIDE SERPL-SCNC: 106 MMOL/L (ref 95–110)
CLARITY UR: CLEAR
CO2 SERPL-SCNC: 22 MMOL/L (ref 23–29)
COLOR UR: YELLOW
CREAT SERPL-MCNC: 0.8 MG/DL (ref 0.5–1.4)
CTP QC/QA: YES
CTP QC/QA: YES
DIFFERENTIAL METHOD: ABNORMAL
EOSINOPHIL # BLD AUTO: 0 K/UL (ref 0–0.5)
EOSINOPHIL NFR BLD: 0.2 % (ref 0–8)
ERYTHROCYTE [DISTWIDTH] IN BLOOD BY AUTOMATED COUNT: 11.7 % (ref 11.5–14.5)
EST. GFR  (AFRICAN AMERICAN): >60 ML/MIN/1.73 M^2
EST. GFR  (NON AFRICAN AMERICAN): >60 ML/MIN/1.73 M^2
GLUCOSE SERPL-MCNC: 106 MG/DL (ref 70–110)
GLUCOSE UR QL STRIP: NEGATIVE
HCT VFR BLD AUTO: 37.5 % (ref 37–48.5)
HGB BLD-MCNC: 12.7 G/DL (ref 12–16)
HGB UR QL STRIP: NEGATIVE
IMM GRANULOCYTES # BLD AUTO: 0.04 K/UL (ref 0–0.04)
IMM GRANULOCYTES NFR BLD AUTO: 0.4 % (ref 0–0.5)
KETONES UR QL STRIP: NEGATIVE
LEUKOCYTE ESTERASE UR QL STRIP: NEGATIVE
LYMPHOCYTES # BLD AUTO: 0.3 K/UL (ref 1–4.8)
LYMPHOCYTES NFR BLD: 2.6 % (ref 18–48)
MCH RBC QN AUTO: 29.6 PG (ref 27–31)
MCHC RBC AUTO-ENTMCNC: 33.9 G/DL (ref 32–36)
MCV RBC AUTO: 87 FL (ref 82–98)
MONOCYTES # BLD AUTO: 0.4 K/UL (ref 0.3–1)
MONOCYTES NFR BLD: 3.7 % (ref 4–15)
NEUTROPHILS # BLD AUTO: 8.9 K/UL (ref 1.8–7.7)
NEUTROPHILS NFR BLD: 93 % (ref 38–73)
NITRITE UR QL STRIP: NEGATIVE
NRBC BLD-RTO: 0 /100 WBC
PH UR STRIP: >8 [PH] (ref 5–8)
PLATELET # BLD AUTO: 238 K/UL (ref 150–450)
PMV BLD AUTO: 11.4 FL (ref 9.2–12.9)
POC MOLECULAR INFLUENZA A AGN: NEGATIVE
POC MOLECULAR INFLUENZA B AGN: NEGATIVE
POTASSIUM SERPL-SCNC: 4 MMOL/L (ref 3.5–5.1)
PROT SERPL-MCNC: 7.4 G/DL (ref 6–8.4)
PROT UR QL STRIP: NEGATIVE
RBC # BLD AUTO: 4.29 M/UL (ref 4–5.4)
SARS-COV-2 RDRP RESP QL NAA+PROBE: NEGATIVE
SODIUM SERPL-SCNC: 139 MMOL/L (ref 136–145)
SP GR UR STRIP: 1.01 (ref 1–1.03)
URN SPEC COLLECT METH UR: ABNORMAL
UROBILINOGEN UR STRIP-ACNC: NEGATIVE EU/DL
WBC # BLD AUTO: 9.58 K/UL (ref 3.9–12.7)

## 2022-03-25 PROCEDURE — 25000003 PHARM REV CODE 250: Performed by: EMERGENCY MEDICINE

## 2022-03-25 PROCEDURE — 96375 TX/PRO/DX INJ NEW DRUG ADDON: CPT

## 2022-03-25 PROCEDURE — 81003 URINALYSIS AUTO W/O SCOPE: CPT | Performed by: EMERGENCY MEDICINE

## 2022-03-25 PROCEDURE — 99284 EMERGENCY DEPT VISIT MOD MDM: CPT | Mod: 25

## 2022-03-25 PROCEDURE — 96374 THER/PROPH/DIAG INJ IV PUSH: CPT

## 2022-03-25 PROCEDURE — 96361 HYDRATE IV INFUSION ADD-ON: CPT

## 2022-03-25 PROCEDURE — 81025 URINE PREGNANCY TEST: CPT | Performed by: EMERGENCY MEDICINE

## 2022-03-25 PROCEDURE — 85025 COMPLETE CBC W/AUTO DIFF WBC: CPT | Performed by: EMERGENCY MEDICINE

## 2022-03-25 PROCEDURE — U0002 COVID-19 LAB TEST NON-CDC: HCPCS | Performed by: EMERGENCY MEDICINE

## 2022-03-25 PROCEDURE — 63600175 PHARM REV CODE 636 W HCPCS: Performed by: EMERGENCY MEDICINE

## 2022-03-25 PROCEDURE — 80053 COMPREHEN METABOLIC PANEL: CPT | Performed by: EMERGENCY MEDICINE

## 2022-03-25 RX ORDER — DIPHENOXYLATE HYDROCHLORIDE AND ATROPINE SULFATE 2.5; .025 MG/1; MG/1
1 TABLET ORAL 4 TIMES DAILY PRN
Qty: 20 TABLET | Refills: 0 | Status: SHIPPED | OUTPATIENT
Start: 2022-03-25 | End: 2022-04-04

## 2022-03-25 RX ORDER — ONDANSETRON 4 MG/1
4 TABLET, FILM COATED ORAL EVERY 6 HOURS
Qty: 15 TABLET | Refills: 0 | Status: SHIPPED | OUTPATIENT
Start: 2022-03-25

## 2022-03-25 RX ORDER — ONDANSETRON 2 MG/ML
4 INJECTION INTRAMUSCULAR; INTRAVENOUS
Status: COMPLETED | OUTPATIENT
Start: 2022-03-25 | End: 2022-03-25

## 2022-03-25 RX ORDER — NAPROXEN 375 MG/1
375 TABLET ORAL 2 TIMES DAILY WITH MEALS
Qty: 30 TABLET | Refills: 0 | Status: SHIPPED | OUTPATIENT
Start: 2022-03-25

## 2022-03-25 RX ORDER — KETOROLAC TROMETHAMINE 30 MG/ML
15 INJECTION, SOLUTION INTRAMUSCULAR; INTRAVENOUS
Status: COMPLETED | OUTPATIENT
Start: 2022-03-25 | End: 2022-03-25

## 2022-03-25 RX ORDER — AZELASTINE 1 MG/ML
2 SPRAY, METERED NASAL 2 TIMES DAILY
COMMUNITY

## 2022-03-25 RX ORDER — PROMETHAZINE HYDROCHLORIDE AND DEXTROMETHORPHAN HYDROBROMIDE 6.25; 15 MG/5ML; MG/5ML
5 SYRUP ORAL 4 TIMES DAILY PRN
COMMUNITY
Start: 2021-07-09 | End: 2023-11-29 | Stop reason: ALTCHOICE

## 2022-03-25 RX ORDER — FLUTICASONE PROPIONATE 50 MCG
2 SPRAY, SUSPENSION (ML) NASAL DAILY
COMMUNITY

## 2022-03-25 RX ADMIN — ONDANSETRON 4 MG: 2 INJECTION INTRAMUSCULAR; INTRAVENOUS at 08:03

## 2022-03-25 RX ADMIN — KETOROLAC TROMETHAMINE 15 MG: 30 INJECTION, SOLUTION INTRAMUSCULAR at 10:03

## 2022-03-25 RX ADMIN — SODIUM CHLORIDE 1000 ML: 0.9 INJECTION, SOLUTION INTRAVENOUS at 08:03

## 2022-03-25 NOTE — ED PROVIDER NOTES
SCRIBE #1 NOTE: I, Phani Velasco, am scribing for, and in the presence of, Edmond Vaughan MD. I have scribed the entire note.      History      Chief Complaint   Patient presents with    Abdominal Pain     Abdominal pain, vomiting, chills       Review of patient's allergies indicates:   Allergen Reactions    Latex Itching and Other (See Comments)     Redness        HPI   HPI    3/25/2022, 7:40 AM   History obtained from the patient      History of Present Illness: Ruslan Perez is a 21 y.o. female patient who presents to the Emergency Department for n/v/d, onset last night. Symptoms are episodic and moderate in severity. No mitigating or exacerbating factors reported. Associated sxs include generalized body aches and chills. Patient denies any fever, SOB, CP, weakness, numbness, dizziness, headache, and all other sxs at this time. No prior Tx reported. No further complaints or concerns at this time.     Arrival mode: EMS     PCP: Erika Blackwell MD       Past Medical History:  Past Medical History:   Diagnosis Date    ADHD (attention deficit hyperactivity disorder)     Anxiety     Anxiety state 11/14/2016 8:45:01 AM    [facility]: Anxiety-No Additional Notes    Anxiety state 11/14/2016 8:45:01 AM    [facility]: Anxiety-No Additional Notes    Calculus of kidney 8/20/2018 11:46:04 AM    [facility] - Unknown: Kidney stones, calcium oxalate-No Additional Notes    Calculus of kidney 8/20/2018 11:46:04 AM    [facility] - Unknown: Kidney stones, calcium oxalate-No Additional Notes    Depression     History of chlamydia     Infection of kidney 8/20/2018 11:46:21 AM    [facility]: Kidney Infection-No Additional Notes    Infection of kidney 8/20/2018 11:46:21 AM    [facility]: Kidney Infection-No Additional Notes    Lactose intolerance     Migraine headache     Other and unspecified ovarian cyst  6/27/2016 12:55:46 PM    [facility]: Ovarian Cyst-No Additional Notes    Other and unspecified ovarian cyst 6/27/2016 12:55:46 PM    [facility]: Ovarian Cyst-No Additional Notes    Other depressive disorder 11/14/2016 8:45:02 AM    [facility]    Other depressive disorder 11/14/2016 8:45:02 AM    [facility]: Depression-No Additional Notes    Renal disorder     kidney stones    TIA (transient ischemic attack)     Unspecified chlamydial infection, in conditions classified elsewhere and of unspecified site 9/28/2017 3:59:08 PM    [facility]: Chlamydia-No Additional Notes    Unspecified chlamydial infection, in conditions classified elsewhere and of unspecified site 9/28/2017 3:59:08 PM    [facility]: Chlamydia-No Additional Notes    Urinary tract infection 8/20/2018 11:46:40 AM    [facility]: Urinary Tract Infection-No Additional Notes    Urinary tract infection 8/20/2018 11:46:40 AM    [facility]: Urinary Tract Infection-No Additional Notes       Past Surgical History:  Past Surgical History:   Procedure Laterality Date    blood vessel removed from chest      BREAST SURGERY  03/22/2019    kidney stent      Mirena      placed 2 years ago         Family History:  Family History   Problem Relation Age of Onset    Breast cancer Paternal Grandmother     Breast cancer Paternal Aunt     Colon cancer Neg Hx     Ovarian cancer Neg Hx        Social History:  Social History     Tobacco Use    Smoking status: Current Some Day Smoker     Packs/day: 0.50     Types: Vaping with nicotine    Smokeless tobacco: Current User   Substance and Sexual Activity    Alcohol use: No    Drug use: No    Sexual activity: Yes     Partners: Male     Birth control/protection: None       ROS   Review of Systems   Constitutional:  "Positive for chills. Negative for fever.   HENT: Negative for sore throat.    Respiratory: Negative for shortness of breath.    Cardiovascular: Negative for chest pain.   Gastrointestinal: Positive for diarrhea, nausea and vomiting.   Genitourinary: Negative for dysuria.   Musculoskeletal: Positive for myalgias (generalized body aches). Negative for back pain.   Skin: Negative for rash.   Neurological: Negative for dizziness, weakness, light-headedness, numbness and headaches.   Hematological: Does not bruise/bleed easily.   All other systems reviewed and are negative.    Physical Exam      Initial Vitals [03/25/22 0737]   BP Pulse Resp Temp SpO2   (!) 81/47 92 20 98.8 °F (37.1 °C) 100 %      MAP       --          Physical Exam  Nursing Notes and Vital Signs Reviewed.  Constitutional: Patient is in no acute distress. Well-developed and well-nourished.  Head: Atraumatic. Normocephalic.  Eyes: PERRL. EOM intact. Conjunctivae are not pale. No scleral icterus.  ENT: Mucous membranes are moist. Oropharynx is clear and symmetric.    Neck: Supple. Full ROM.  Cardiovascular: Regular rate. Regular rhythm. No murmurs, rubs, or gallops. Distal pulses are 2+ and symmetric.  Pulmonary/Chest: No respiratory distress. Clear to auscultation bilaterally. No wheezing or rales.  Abdominal: Soft and non-distended.  There is no tenderness.  No rebound, guarding, or rigidity.   Musculoskeletal: Moves all extremities. No obvious deformities. No edema.  Skin: Warm and dry.  Neurological:  Alert, awake, and appropriate.  Normal speech.  No acute focal neurological deficits are appreciated.  Psychiatric: Normal affect. Good eye contact. Appropriate in content.    ED Course    Procedures  ED Vital Signs:  Vitals:    03/25/22 0737 03/25/22 0741   BP: (!) 81/47 105/62   Pulse: 92 105   Resp: 20 18   Temp: 98.8 °F (37.1 °C)    TempSrc: Oral    SpO2: 100% 99%   Weight: 53.7 kg (118 lb 6.2 oz)    Height: 4' 11" (1.499 m)        Abnormal Lab " Results:  Labs Reviewed   CBC W/ AUTO DIFFERENTIAL - Abnormal; Notable for the following components:       Result Value    Gran # (ANC) 8.9 (*)     Lymph # 0.3 (*)     Gran % 93.0 (*)     Lymph % 2.6 (*)     Mono % 3.7 (*)     All other components within normal limits   COMPREHENSIVE METABOLIC PANEL - Abnormal; Notable for the following components:    CO2 22 (*)     All other components within normal limits   URINALYSIS, REFLEX TO URINE CULTURE - Abnormal; Notable for the following components:    pH, UA >8.0 (*)     All other components within normal limits    Narrative:     Specimen Source->Urine   PREGNANCY TEST, URINE RAPID   SARS-COV-2 RDRP GENE    Narrative:     This test utilizes isothermal nucleic acid amplification   technology to detect the SARS-CoV-2 RdRp nucleic acid segment.   The analytical sensitivity (limit of detection) is 125 genome   equivalents/mL.   A POSITIVE result implies infection with the SARS-CoV-2 virus;   the patient is presumed to be contagious.     A NEGATIVE result means that SARS-CoV-2 nucleic acids are not   present above the limit of detection. A NEGATIVE result should be   treated as presumptive. It does not rule out the possibility of   COVID-19 and should not be the sole basis for treatment decisions.   If COVID-19 is strongly suspected based on clinical and exposure   history, re-testing using an alternate molecular assay should be   considered.   This test is only for use under the Food and Drug   Administration s Emergency Use Authorization (EUA).   Commercial kits are provided by [employer].   Performance characteristics of the EUA have been independently   verified by [facility] Department of   Pathology and Laboratory Medicine.   _________________________________________________________________   The authorized Fact Sheet for Healthcare Providers and the authorized Fact   Sheet for Patients of the ID NOW COVID-19 are available on the FDA   website:      https://www.fda.gov/media/988305/download  https://www.fda.gov/media/293394/download           POCT INFLUENZA A/B MOLECULAR        All Lab Results:  Results for orders placed or performed during the hospital encounter of 03/25/22   CBC Auto Differential   Result Value Ref Range    WBC 9.58 3.90 - 12.70 K/uL    RBC 4.29 4.00 - 5.40 M/uL    Hemoglobin 12.7 12.0 - 16.0 g/dL    Hematocrit 37.5 37.0 - 48.5 %    MCV 87 82 - 98 fL    MCH 29.6 27.0 - 31.0 pg    MCHC 33.9 32.0 - 36.0 g/dL    RDW 11.7 11.5 - 14.5 %    Platelets 238 150 - 450 K/uL    MPV 11.4 9.2 - 12.9 fL    Immature Granulocytes 0.4 0.0 - 0.5 %    Gran # (ANC) 8.9 (H) 1.8 - 7.7 K/uL    Immature Grans (Abs) 0.04 0.00 - 0.04 K/uL    Lymph # 0.3 (L) 1.0 - 4.8 K/uL    Mono # 0.4 0.3 - 1.0 K/uL    Eos # 0.0 0.0 - 0.5 K/uL    Baso # 0.01 0.00 - 0.20 K/uL    nRBC 0 0 /100 WBC    Gran % 93.0 (H) 38.0 - 73.0 %    Lymph % 2.6 (L) 18.0 - 48.0 %    Mono % 3.7 (L) 4.0 - 15.0 %    Eosinophil % 0.2 0.0 - 8.0 %    Basophil % 0.1 0.0 - 1.9 %    Differential Method Automated    Comprehensive Metabolic Panel   Result Value Ref Range    Sodium 139 136 - 145 mmol/L    Potassium 4.0 3.5 - 5.1 mmol/L    Chloride 106 95 - 110 mmol/L    CO2 22 (L) 23 - 29 mmol/L    Glucose 106 70 - 110 mg/dL    BUN 14 6 - 20 mg/dL    Creatinine 0.8 0.5 - 1.4 mg/dL    Calcium 9.1 8.7 - 10.5 mg/dL    Total Protein 7.4 6.0 - 8.4 g/dL    Albumin 3.9 3.5 - 5.2 g/dL    Total Bilirubin 1.0 0.1 - 1.0 mg/dL    Alkaline Phosphatase 72 55 - 135 U/L    AST 17 10 - 40 U/L    ALT 17 10 - 44 U/L    Anion Gap 11 8 - 16 mmol/L    eGFR if African American >60 >60 mL/min/1.73 m^2    eGFR if non African American >60 >60 mL/min/1.73 m^2   Urinalysis, Reflex to Urine Culture Urine, Clean Catch    Specimen: Urine   Result Value Ref Range    Specimen UA Urine, Clean Catch     Color, UA Yellow Yellow, Straw, Piedad    Appearance, UA Clear Clear    pH, UA >8.0 (A) 5.0 - 8.0    Specific Gravity, UA 1.015 1.005 - 1.030     Protein, UA Negative Negative    Glucose, UA Negative Negative    Ketones, UA Negative Negative    Bilirubin (UA) Negative Negative    Occult Blood UA Negative Negative    Nitrite, UA Negative Negative    Urobilinogen, UA Negative <2.0 EU/dL    Leukocytes, UA Negative Negative   Pregnancy, urine rapid   Result Value Ref Range    Preg Test, Ur Negative    POCT COVID-19 Rapid Screening   Result Value Ref Range    POC Rapid COVID Negative Negative     Acceptable Yes    POCT Influenza A/B Molecular   Result Value Ref Range    POC Molecular Influenza A Ag Negative Negative, Not Reported    POC Molecular Influenza B Ag Negative Negative, Not Reported     Acceptable Yes      Imaging Results:  Imaging Results          X-Ray Chest AP Portable (Final result)  Result time 03/25/22 10:06:46    Final result by Latrell Aviles MD (03/25/22 10:06:46)                 Impression:      No acute process seen.      Electronically signed by: Latrell Aviles MD  Date:    03/25/2022  Time:    10:06             Narrative:    EXAMINATION:  XR CHEST AP PORTABLE    CLINICAL HISTORY:  cough;    FINDINGS:  Single view of the chest.  Comparison 03/19/2021    Cardiac silhouette is normal.  The lungs demonstrate no evidence of active disease.  No evidence of pleural effusion or pneumothorax.  Bones appear intact.                                        The Emergency Provider reviewed the vital signs and test results, which are outlined above.    ED Discussion     10:07 AM: Reassessed pt at this time. Discussed with pt all pertinent ED information and results. Discussed pt dx and plan of tx. Gave pt all f/u and return to the ED instructions. All questions and concerns were addressed at this time. Pt expresses understanding of information and instructions, and is comfortable with plan to discharge. Pt is stable for discharge.    I discussed with patient and/or family/caretaker that evaluation in the ED does not suggest  any emergent or life threatening medical conditions requiring immediate intervention beyond what was provided in the ED, and I believe patient is safe for discharge.  Regardless, an unremarkable evaluation in the ED does not preclude the development or presence of a serious of life threatening condition. As such, patient was instructed to return immediately for any worsening or change in current symptoms.         ED Medication(s):  Medications   ketorolac injection 15 mg (has no administration in time range)   sodium chloride 0.9% bolus 1,000 mL (1,000 mLs Intravenous New Bag 3/25/22 0802)   ondansetron injection 4 mg (4 mg Intravenous Given 3/25/22 0800)        Follow-up Information     Erika Blackwell MD.    Specialty: Family Medicine  Contact information:  [address]  [address]  [place] [address]  [phone]                        New Prescriptions    DIPHENOXYLATE-ATROPINE 2.5-0.025 MG (LOMOTIL) 2.5-0.025 MG PER TABLET    Take 1 tablet by mouth 4 (four) times daily as needed for Diarrhea.    NAPROXEN (NAPROSYN) 375 MG TABLET    Take 1 tablet (375 mg total) by mouth 2 (two) times daily with meals.    ONDANSETRON (ZOFRAN) 4 MG TABLET    Take 1 tablet (4 mg total) by mouth every 6 (six) hours.         Medical Decision Making    Medical Decision Making:   Clinical Tests:   Lab Tests: Ordered and Reviewed  Radiological Study: Ordered and Reviewed           Scribe Attestation:   Scribe #1: I performed the above scribed service and the documentation accurately describes the services I performed. I attest to the accuracy of the note.    Attending:   Physician Attestation Statement for Scribe #1: I, Edmond Vaughan MD, personally performed the services described in this documentation, as scribed by Phani Velasco, in my presence, and it is both accurate and complete.          Clinical Impression       ICD-10-CM ICD-9-CM   1. Non-intractable vomiting with nausea, unspecified vomiting type  R11.2 787.01   2.  Diarrhea, unspecified type  R19.7 787.91   3. Viral syndrome  B34.9 079.99       Disposition:   Disposition: Discharged  Condition: Stable         Edmond Vaughan MD  03/25/22 1012

## 2022-03-25 NOTE — PHARMACY MED REC
"Admission Medication History     The home medication history was taken by Christian Huang.    You may go to "Admission" then "Reconcile Home Medications" tabs to review and/or act upon these items.      The home medication list has been updated by the Pharmacy department.    Please read ALL comments highlighted in yellow.    Please address this information as you see fit.     Feel free to contact us if you have any questions or require assistance.      The medications listed below were removed from the home medication list. Please reorder if appropriate:  Patient reports no longer taking the following medication(s):   ALPRAZOLAM 0.5 MG TABLET   ALPRAZOLAM 0.25 MG TABLET   AZITHROMYCIN 250 MG TABLET Z-JOSE RAFAEL   DEXAMETHASONE 6 MG TABLET   ONDANSETRON 8 MG TABLET   ZONISAMIDE 100 MG CAPSULE    Medications listed below were obtained from: Patient/family, Analytic software- [employer] and Medical records  (Not in a hospital admission)      Potential issues to be addressed PRIOR TO DISCHARGE: NONE      Christian Huang CPhT  [employer] [phone]      Current Outpatient Medications on File Prior to Encounter   Medication Sig Dispense Refill Last Dose    azelastine (ASTELIN) 137 mcg (0.1 %) nasal spray 2 sprays by Nasal route 2 (two) times daily.   Past Week at Unknown time    clonazePAM (KLONOPIN) 2 MG Tab Take 2 mg by mouth once daily.   3/24/2022 at Unknown time    FLUoxetine 40 MG capsule Take 40 mg by mouth once daily.   3/24/2022 at Unknown time    fluticasone propionate (FLONASE) 50 mcg/actuation nasal spray 2 sprays by Each Nostril route once daily.   Past Week at Unknown time    promethazine-dextromethorphan (PROMETHAZINE-DM) 6.25-15 mg/5 mL Syrp Take 5 mLs by mouth 4 (four) times daily as needed for Cough. for 10 days   3/25/2022 at Unknown time    QUEtiapine (SEROQUEL) 100 MG Tab Take 100 mg by mouth nightly.   3/24/2022 at Unknown time    [DISCONTINUED] ALPRAZolam (XANAX) 0.5 MG tablet alprazolam 0.5 " mg tablet   TK 1 T PO 30 MINUTES PRIOR TO PROCEDURE       [DISCONTINUED] dexamethasone (DECADRON) 0.1 % ophthalmic solution Inject into the muscle.       [DISCONTINUED] ondansetron (ZOFRAN) 8 MG tablet ondansetron HCl 8 mg tablet   Take 1 tablet every 8 hours by oral route as needed.       [DISCONTINUED] zonisamide (ZONEGRAN) 100 MG Cap                                 .

## 2022-03-25 NOTE — Clinical Note
"Ruslan Sheppardcristian Perez was seen and treated in our emergency department on 3/25/2022.  She may return to work on 03/28/2022.       If you have any questions or concerns, please don't hesitate to call.      Edmond Vaughan MD"

## 2022-04-22 ENCOUNTER — TELEPHONE (OUTPATIENT)
Dept: OBSTETRICS AND GYNECOLOGY | Facility: CLINIC | Age: 22
End: 2022-04-22
Payer: MEDICAID

## 2022-04-22 NOTE — TELEPHONE ENCOUNTER
----- Message from Stephania Lopez sent at 4/22/2022  2:08 PM CDT -----  .Type:  Sooner Apoointment Request    Caller is requesting a sooner appointment.  Caller declined first available appointment listed below.  Caller will not accept being placed on the waitlist and is requesting a message be sent to doctor.  Name of Caller:.Ruslan Perez   When is the first available appointment? 5/23/2022  Symptoms:chronic discharge  Would the patient rather a call back or a response via [facility]? Call back  Best Call Back Number:.[phone]   Additional Information:

## 2022-04-22 NOTE — TELEPHONE ENCOUNTER
I have never seen pt she was seeing Aissatou. Just see what appointments area available to her.  Depending her issue if needs can go to urgent care since weekend.

## 2022-04-26 ENCOUNTER — OFFICE VISIT (OUTPATIENT)
Dept: OBSTETRICS AND GYNECOLOGY | Facility: CLINIC | Age: 22
End: 2022-04-26
Payer: MEDICAID

## 2022-04-26 ENCOUNTER — PATIENT MESSAGE (OUTPATIENT)
Dept: OBSTETRICS AND GYNECOLOGY | Facility: CLINIC | Age: 22
End: 2022-04-26

## 2022-04-26 VITALS — BODY MASS INDEX: 21.77 KG/M2 | SYSTOLIC BLOOD PRESSURE: 98 MMHG | DIASTOLIC BLOOD PRESSURE: 64 MMHG | WEIGHT: 107.81 LBS

## 2022-04-26 DIAGNOSIS — N89.8 VAGINAL DISCHARGE: Primary | ICD-10-CM

## 2022-04-26 DIAGNOSIS — A59.01 TRICHOMONAS VAGINALIS (TV) INFECTION: ICD-10-CM

## 2022-04-26 DIAGNOSIS — Z11.3 SCREEN FOR STD (SEXUALLY TRANSMITTED DISEASE): ICD-10-CM

## 2022-04-26 PROCEDURE — 87591 N.GONORRHOEAE DNA AMP PROB: CPT | Performed by: NURSE PRACTITIONER

## 2022-04-26 PROCEDURE — 1159F PR MEDICATION LIST DOCUMENTED IN MEDICAL RECORD: ICD-10-PCS | Mod: CPTII,,, | Performed by: NURSE PRACTITIONER

## 2022-04-26 PROCEDURE — 3074F SYST BP LT 130 MM HG: CPT | Mod: CPTII,,, | Performed by: NURSE PRACTITIONER

## 2022-04-26 PROCEDURE — 87491 CHLMYD TRACH DNA AMP PROBE: CPT | Performed by: NURSE PRACTITIONER

## 2022-04-26 PROCEDURE — 99999 PR PBB SHADOW E&M-EST. PATIENT-LVL III: ICD-10-PCS | Mod: PBBFAC,,, | Performed by: NURSE PRACTITIONER

## 2022-04-26 PROCEDURE — 3074F PR MOST RECENT SYSTOLIC BLOOD PRESSURE < 130 MM HG: ICD-10-PCS | Mod: CPTII,,, | Performed by: NURSE PRACTITIONER

## 2022-04-26 PROCEDURE — 99213 PR OFFICE/OUTPT VISIT, EST, LEVL III, 20-29 MIN: ICD-10-PCS | Mod: S$PBB,,, | Performed by: NURSE PRACTITIONER

## 2022-04-26 PROCEDURE — 99213 OFFICE O/P EST LOW 20 MIN: CPT | Mod: PBBFAC | Performed by: NURSE PRACTITIONER

## 2022-04-26 PROCEDURE — 3078F DIAST BP <80 MM HG: CPT | Mod: CPTII,,, | Performed by: NURSE PRACTITIONER

## 2022-04-26 PROCEDURE — 1159F MED LIST DOCD IN RCRD: CPT | Mod: CPTII,,, | Performed by: NURSE PRACTITIONER

## 2022-04-26 PROCEDURE — 3078F PR MOST RECENT DIASTOLIC BLOOD PRESSURE < 80 MM HG: ICD-10-PCS | Mod: CPTII,,, | Performed by: NURSE PRACTITIONER

## 2022-04-26 PROCEDURE — 99999 PR PBB SHADOW E&M-EST. PATIENT-LVL III: CPT | Mod: PBBFAC,,, | Performed by: NURSE PRACTITIONER

## 2022-04-26 PROCEDURE — 3008F PR BODY MASS INDEX (BMI) DOCUMENTED: ICD-10-PCS | Mod: CPTII,,, | Performed by: NURSE PRACTITIONER

## 2022-04-26 PROCEDURE — 99213 OFFICE O/P EST LOW 20 MIN: CPT | Mod: S$PBB,,, | Performed by: NURSE PRACTITIONER

## 2022-04-26 PROCEDURE — 1160F RVW MEDS BY RX/DR IN RCRD: CPT | Mod: CPTII,,, | Performed by: NURSE PRACTITIONER

## 2022-04-26 PROCEDURE — 87210 SMEAR WET MOUNT SALINE/INK: CPT | Mod: PBBFAC | Performed by: NURSE PRACTITIONER

## 2022-04-26 PROCEDURE — 1160F PR REVIEW ALL MEDS BY PRESCRIBER/CLIN PHARMACIST DOCUMENTED: ICD-10-PCS | Mod: CPTII,,, | Performed by: NURSE PRACTITIONER

## 2022-04-26 PROCEDURE — 3008F BODY MASS INDEX DOCD: CPT | Mod: CPTII,,, | Performed by: NURSE PRACTITIONER

## 2022-04-26 RX ORDER — METRONIDAZOLE 500 MG/1
2000 TABLET ORAL ONCE
Qty: 4 TABLET | Refills: 0 | Status: SHIPPED | OUTPATIENT
Start: 2022-04-26 | End: 2022-04-26

## 2022-04-26 NOTE — PROGRESS NOTES
Subjective:       Patient ID: Ruslan Perez is a 21 y.o. female.    Chief Complaint:  Vaginal Discharge    Patient's last menstrual period was 2022.  History of Present Illness  Patient presents to clinic with complaints of increased vaginal discharge with associated vaginal odor.  Denies vaginal itching although does report mild vaginal irritation.  Patient reports pain with intercourse.  Patient reports chronic bacterial vaginosis in which she has been self treating at home with boric acid vaginal suppositories. Patient requesting STD screening as well.     OB History    Para Term  AB Living   1 1 1 0 0 1   SAB IAB Ectopic Multiple Live Births   0 0 0 0 1      # Outcome Date GA Lbr Marcel/2nd Weight Sex Delivery Anes PTL Lv   1 Term 17    M Vag-Spont EPI N JONATAN       Review of Systems  Review of Systems   Constitutional: Negative for appetite change, fatigue and fever.   Gastrointestinal: Negative for abdominal pain, bloating, constipation, diarrhea, nausea and vomiting.   Genitourinary: Positive for dyspareunia, vaginal discharge and vaginal odor. Negative for bladder incontinence, dysmenorrhea, dysuria, flank pain, frequency, genital sores, menorrhagia, menstrual problem, pelvic pain, urgency, vaginal bleeding, vaginal pain, postcoital bleeding and vaginal dryness.        Vaginal irritation   All other systems reviewed and are negative.           Objective:      Physical Exam:   Constitutional: She is oriented to person, place, and time. She appears well-developed and well-nourished.    HENT:   Head: Normocephalic and atraumatic.   Nose: Nose normal.    Eyes: Pupils are equal, round, and reactive to light. Conjunctivae and EOM are normal.     Cardiovascular: Normal rate and regular rhythm.     Pulmonary/Chest: Effort normal.        Abdominal: Soft. She exhibits no distension. There is no abdominal tenderness. Hernia confirmed negative in the right inguinal area and confirmed  negative in the left inguinal area.     Genitourinary:    Inguinal canal, uterus, right adnexa, left adnexa and rectum normal.      Pelvic exam was performed with patient supine.   The external female genitalia was normal.   Genitalia hair distrobution normal .   Labial bartholins normal.There is no rash, tenderness, lesion or injury on the right labia. There is no rash, tenderness, lesion or injury on the left labia. Cervix is normal. Right adnexum displays no mass, no tenderness and no fullness. Left adnexum displays no mass, no tenderness and no fullness. There is vaginal discharge (whitish green, thin) in the vagina. No erythema, rectocele or cystocele in the vagina.           Musculoskeletal: Normal range of motion and moves all extremeties.      Lymphadenopathy: No inguinal adenopathy noted on the right or left side.    Neurological: She is alert and oriented to person, place, and time.    Skin: Skin is warm and dry. She is not diaphoretic.    Psychiatric: She has a normal mood and affect. Her behavior is normal. Judgment and thought content normal.        Microscopic wet-mount exam shows trichomonads, white blood cells, DNA probe for chlamydia and GC obtained.       Assessment:     1. Vaginal discharge    2. Screen for STD (sexually transmitted disease)    3. Trichomonas vaginalis (TV) infection              Plan:   Ruslan was seen today for vaginal discharge.    Diagnoses and all orders for this visit:    Vaginal discharge  -     POCT WET PREP    Screen for STD (sexually transmitted disease)  -     C. trachomatis/N. gonorrhoeae by AMP DNA    Trichomonas vaginalis (TV) infection  -     metroNIDAZOLE (FLAGYL) 500 MG tablet; Take 4 tablets (2,000 mg total) by mouth once. for 1 dose        Discussed that Trichomonas is a parasite that is most commonly acquired through sexual intercourse with an infected person.  This is treated with Flagyl. Recommend NO alcohol consumption while taking and for 24 hours after  completion.     Refer partner for testing and treatment and abstain from sexual intercourse with partner for 2 weeks after both have completed the treatment.  Recommend repeat testing to ensure infection has cleared in 6 weeks.

## 2022-04-27 LAB
C TRACH DNA SPEC QL NAA+PROBE: NOT DETECTED
N GONORRHOEA DNA SPEC QL NAA+PROBE: NOT DETECTED

## 2022-12-05 ENCOUNTER — OFFICE VISIT (OUTPATIENT)
Dept: OBSTETRICS AND GYNECOLOGY | Facility: CLINIC | Age: 22
End: 2022-12-05
Payer: MEDICAID

## 2022-12-05 VITALS
SYSTOLIC BLOOD PRESSURE: 100 MMHG | DIASTOLIC BLOOD PRESSURE: 62 MMHG | WEIGHT: 119.94 LBS | BODY MASS INDEX: 24.18 KG/M2 | HEIGHT: 59 IN

## 2022-12-05 DIAGNOSIS — N94.10 DYSPAREUNIA IN FEMALE: ICD-10-CM

## 2022-12-05 DIAGNOSIS — Z01.419 WELL WOMAN EXAM WITH ROUTINE GYNECOLOGICAL EXAM: Primary | ICD-10-CM

## 2022-12-05 DIAGNOSIS — Z11.3 SCREEN FOR STD (SEXUALLY TRANSMITTED DISEASE): ICD-10-CM

## 2022-12-05 DIAGNOSIS — Z12.4 ENCOUNTER FOR SCREENING FOR CERVICAL CANCER: ICD-10-CM

## 2022-12-05 PROCEDURE — 3074F SYST BP LT 130 MM HG: CPT | Mod: CPTII,,, | Performed by: NURSE PRACTITIONER

## 2022-12-05 PROCEDURE — 81514 NFCT DS BV&VAGINITIS DNA ALG: CPT | Performed by: NURSE PRACTITIONER

## 2022-12-05 PROCEDURE — 88175 CYTOPATH C/V AUTO FLUID REDO: CPT | Performed by: NURSE PRACTITIONER

## 2022-12-05 PROCEDURE — 3074F PR MOST RECENT SYSTOLIC BLOOD PRESSURE < 130 MM HG: ICD-10-PCS | Mod: CPTII,,, | Performed by: NURSE PRACTITIONER

## 2022-12-05 PROCEDURE — 99395 PR PREVENTIVE VISIT,EST,18-39: ICD-10-PCS | Mod: S$PBB,,, | Performed by: NURSE PRACTITIONER

## 2022-12-05 PROCEDURE — 1160F RVW MEDS BY RX/DR IN RCRD: CPT | Mod: CPTII,,, | Performed by: NURSE PRACTITIONER

## 2022-12-05 PROCEDURE — 99999 PR PBB SHADOW E&M-EST. PATIENT-LVL III: ICD-10-PCS | Mod: PBBFAC,,, | Performed by: NURSE PRACTITIONER

## 2022-12-05 PROCEDURE — 87491 CHLMYD TRACH DNA AMP PROBE: CPT | Performed by: NURSE PRACTITIONER

## 2022-12-05 PROCEDURE — 3008F PR BODY MASS INDEX (BMI) DOCUMENTED: ICD-10-PCS | Mod: CPTII,,, | Performed by: NURSE PRACTITIONER

## 2022-12-05 PROCEDURE — 99213 OFFICE O/P EST LOW 20 MIN: CPT | Mod: PBBFAC | Performed by: NURSE PRACTITIONER

## 2022-12-05 PROCEDURE — 3078F DIAST BP <80 MM HG: CPT | Mod: CPTII,,, | Performed by: NURSE PRACTITIONER

## 2022-12-05 PROCEDURE — 3008F BODY MASS INDEX DOCD: CPT | Mod: CPTII,,, | Performed by: NURSE PRACTITIONER

## 2022-12-05 PROCEDURE — 1159F MED LIST DOCD IN RCRD: CPT | Mod: CPTII,,, | Performed by: NURSE PRACTITIONER

## 2022-12-05 PROCEDURE — 99999 PR PBB SHADOW E&M-EST. PATIENT-LVL III: CPT | Mod: PBBFAC,,, | Performed by: NURSE PRACTITIONER

## 2022-12-05 PROCEDURE — 1160F PR REVIEW ALL MEDS BY PRESCRIBER/CLIN PHARMACIST DOCUMENTED: ICD-10-PCS | Mod: CPTII,,, | Performed by: NURSE PRACTITIONER

## 2022-12-05 PROCEDURE — 1159F PR MEDICATION LIST DOCUMENTED IN MEDICAL RECORD: ICD-10-PCS | Mod: CPTII,,, | Performed by: NURSE PRACTITIONER

## 2022-12-05 PROCEDURE — 99395 PREV VISIT EST AGE 18-39: CPT | Mod: S$PBB,,, | Performed by: NURSE PRACTITIONER

## 2022-12-05 PROCEDURE — 3078F PR MOST RECENT DIASTOLIC BLOOD PRESSURE < 80 MM HG: ICD-10-PCS | Mod: CPTII,,, | Performed by: NURSE PRACTITIONER

## 2022-12-05 PROCEDURE — 87591 N.GONORRHOEAE DNA AMP PROB: CPT | Performed by: NURSE PRACTITIONER

## 2022-12-05 NOTE — PROGRESS NOTES
Subjective:       Patient ID: Ruslan Perez is a 22 y.o. female.    Chief Complaint:  Well Woman    Patient's last menstrual period was 2022.  History of Present Illness  Annual Exam-Premenopausal  Patient presents for annual exam.  The patient is sexually active. GYN screening history: last pap: approximate date 20 and was normal. The patient wears seatbelts: yes. The patient participates in regular exercise: yes. Has the patient ever been transfused or tattooed?: no. The patient reports that there is not domestic violence in her life.  Patient tested and treated for Trichomonas on 2022.  Patient reports partner was treated as well.  Reports resolution in vaginal symptoms, although continuing to have pain with intercourse.  Patient reports pain occurs mostly with deep penetration.  Reports pain feels like uterine contractions.     OB History    Para Term  AB Living   1 1 1 0 0 1   SAB IAB Ectopic Multiple Live Births   0 0 0 0 1      # Outcome Date GA Lbr Marcel/2nd Weight Sex Delivery Anes PTL Lv   1 Term 17    M Vag-Spont EPI N JONATAN       Review of Systems  Review of Systems   Constitutional:  Negative for appetite change, fatigue, fever and unexpected weight change.   Eyes:  Negative for visual disturbance.   Cardiovascular:  Negative for chest pain.   Gastrointestinal:  Negative for abdominal pain, bloating, constipation, diarrhea, nausea and vomiting.   Genitourinary:  Positive for dyspareunia. Negative for bladder incontinence, dysmenorrhea, dysuria, flank pain, frequency, genital sores, menorrhagia, menstrual problem, pelvic pain, urgency, vaginal bleeding, vaginal discharge, vaginal pain, postcoital bleeding, vaginal dryness and vaginal odor.   Integumentary:  Negative for rash, acne, mole/lesion, breast mass, nipple discharge, breast skin changes and breast tenderness.   Neurological:  Negative for syncope and headaches.   Hematological:  Negative for adenopathy.  Does not bruise/bleed easily.   All other systems reviewed and are negative.  Breast: Positive for breast self exam.Negative for asymmetry, lump, mass, nipple discharge, skin changes and tenderness         Objective:      Physical Exam:   Constitutional: She is oriented to person, place, and time. She appears well-developed and well-nourished.    HENT:   Head: Normocephalic and atraumatic.    Eyes: Pupils are equal, round, and reactive to light. Conjunctivae and EOM are normal.     Cardiovascular:  Normal rate and regular rhythm.             Pulmonary/Chest: Effort normal. Right breast exhibits no inverted nipple, no mass, no nipple discharge, no skin change, no tenderness, no bleeding and no swelling. Left breast exhibits no inverted nipple, no mass, no nipple discharge, no skin change, no tenderness, no bleeding and no swelling. Breasts are symmetrical.        Abdominal: Soft. Hernia confirmed negative in the right inguinal area and confirmed negative in the left inguinal area.     Genitourinary:    Inguinal canal, uterus, right adnexa, left adnexa and rectum normal.      Pelvic exam was performed with patient supine.   The external female genitalia was normal.   Genitalia hair distrobution normal .   Labial bartholins normal.There is no rash, tenderness, lesion or injury on the right labia. There is no rash, tenderness, lesion or injury on the left labia. Cervix is normal. There is vaginal discharge (white, creamy) in the vagina. No erythema, bleeding, rectocele, cystocele or unspecified prolapse of vaginal walls in the vagina. Cervix exhibits no motion tenderness and no friability.    pap smear completedUterus is not tender.           Musculoskeletal: Normal range of motion and moves all extremeties.      Lymphadenopathy: No inguinal adenopathy noted on the right or left side.    Neurological: She is alert and oriented to person, place, and time.    Skin: Skin is warm and dry. No rash noted. No erythema.     Psychiatric: She has a normal mood and affect. Her behavior is normal. Judgment and thought content normal.          Assessment:     1. Well woman exam with routine gynecological exam    2. Encounter for screening for cervical cancer    3. Screen for STD (sexually transmitted disease)    4. Dyspareunia in female              Plan:   Ruslan was seen today for well woman.    Diagnoses and all orders for this visit:    Well woman exam with routine gynecological exam  -     Liquid-Based Pap Smear, Screening    Encounter for screening for cervical cancer  -     Liquid-Based Pap Smear, Screening    Screen for STD (sexually transmitted disease)  -     C. trachomatis/N. gonorrhoeae by AMP DNA  -     Vaginosis Screen by DNA Probe    Dyspareunia in female      Will follow vaginal cultures and treat as indicated.  If cultures negative, may consider pelvic ultrasound.  Follow up with me in 1 year for annual well woman exam.

## 2022-12-06 LAB
C TRACH DNA SPEC QL NAA+PROBE: NOT DETECTED
N GONORRHOEA DNA SPEC QL NAA+PROBE: NOT DETECTED

## 2022-12-07 LAB
BACTERIAL VAGINOSIS DNA: NEGATIVE
CANDIDA GLABRATA DNA: NEGATIVE
CANDIDA KRUSEI DNA: NEGATIVE
CANDIDA RRNA VAG QL PROBE: POSITIVE
T VAGINALIS RRNA GENITAL QL PROBE: NEGATIVE

## 2022-12-07 RX ORDER — FLUCONAZOLE 150 MG/1
150 TABLET ORAL
Qty: 2 TABLET | Refills: 0 | Status: SHIPPED | OUTPATIENT
Start: 2022-12-07 | End: 2022-12-11

## 2022-12-09 LAB
FINAL PATHOLOGIC DIAGNOSIS: NORMAL
Lab: NORMAL

## 2023-06-15 ENCOUNTER — HOSPITAL ENCOUNTER (EMERGENCY)
Facility: HOSPITAL | Age: 23
Discharge: HOME | End: 2023-06-15
Attending: EMERGENCY MEDICINE
Payer: MEDICAID

## 2023-06-15 VITALS
DIASTOLIC BLOOD PRESSURE: 62 MMHG | BODY MASS INDEX: 25.02 KG/M2 | SYSTOLIC BLOOD PRESSURE: 108 MMHG | HEART RATE: 81 BPM | OXYGEN SATURATION: 100 % | RESPIRATION RATE: 18 BRPM | HEIGHT: 59 IN | TEMPERATURE: 98 F | WEIGHT: 124.13 LBS

## 2023-06-15 DIAGNOSIS — R10.9 RIGHT FLANK PAIN: Primary | ICD-10-CM

## 2023-06-15 LAB
ALBUMIN SERPL BCP-MCNC: 4.6 G/DL (ref 3.5–5.2)
ALP SERPL-CCNC: 74 U/L (ref 55–135)
ALT SERPL W/O P-5'-P-CCNC: 14 U/L (ref 10–44)
ANION GAP SERPL CALC-SCNC: 10 MMOL/L (ref 8–16)
AST SERPL-CCNC: 19 U/L (ref 10–40)
B-HCG UR QL: NEGATIVE
BACTERIA #/AREA URNS HPF: ABNORMAL /HPF
BASOPHILS # BLD AUTO: 0.02 K/UL (ref 0–0.2)
BASOPHILS NFR BLD: 0.3 % (ref 0–1.9)
BILIRUB SERPL-MCNC: 0.4 MG/DL (ref 0.1–1)
BILIRUB UR QL STRIP: NEGATIVE
BUN SERPL-MCNC: 8 MG/DL (ref 6–20)
CALCIUM SERPL-MCNC: 9.4 MG/DL (ref 8.7–10.5)
CHLORIDE SERPL-SCNC: 108 MMOL/L (ref 95–110)
CLARITY UR: CLEAR
CO2 SERPL-SCNC: 20 MMOL/L (ref 23–29)
COLOR UR: COLORLESS
CREAT SERPL-MCNC: 0.8 MG/DL (ref 0.5–1.4)
DIFFERENTIAL METHOD: NORMAL
EOSINOPHIL # BLD AUTO: 0 K/UL (ref 0–0.5)
EOSINOPHIL NFR BLD: 0.5 % (ref 0–8)
ERYTHROCYTE [DISTWIDTH] IN BLOOD BY AUTOMATED COUNT: 12 % (ref 11.5–14.5)
EST. GFR  (NO RACE VARIABLE): >60 ML/MIN/1.73 M^2
GLUCOSE SERPL-MCNC: 93 MG/DL (ref 70–110)
GLUCOSE UR QL STRIP: NEGATIVE
HCT VFR BLD AUTO: 42.6 % (ref 37–48.5)
HGB BLD-MCNC: 14.2 G/DL (ref 12–16)
HGB UR QL STRIP: NEGATIVE
IMM GRANULOCYTES # BLD AUTO: 0.01 K/UL (ref 0–0.04)
IMM GRANULOCYTES NFR BLD AUTO: 0.2 % (ref 0–0.5)
KETONES UR QL STRIP: NEGATIVE
LEUKOCYTE ESTERASE UR QL STRIP: ABNORMAL
LIPASE SERPL-CCNC: 17 U/L (ref 4–60)
LYMPHOCYTES # BLD AUTO: 1.5 K/UL (ref 1–4.8)
LYMPHOCYTES NFR BLD: 23.9 % (ref 18–48)
MCH RBC QN AUTO: 28.7 PG (ref 27–31)
MCHC RBC AUTO-ENTMCNC: 33.3 G/DL (ref 32–36)
MCV RBC AUTO: 86 FL (ref 82–98)
MICROSCOPIC COMMENT: ABNORMAL
MONOCYTES # BLD AUTO: 0.3 K/UL (ref 0.3–1)
MONOCYTES NFR BLD: 5.1 % (ref 4–15)
NEUTROPHILS # BLD AUTO: 4.4 K/UL (ref 1.8–7.7)
NEUTROPHILS NFR BLD: 70 % (ref 38–73)
NITRITE UR QL STRIP: NEGATIVE
NRBC BLD-RTO: 0 /100 WBC
PH UR STRIP: 7 [PH] (ref 5–8)
PLATELET # BLD AUTO: 271 K/UL (ref 150–450)
PMV BLD AUTO: 11.1 FL (ref 9.2–12.9)
POTASSIUM SERPL-SCNC: 3.8 MMOL/L (ref 3.5–5.1)
PROT SERPL-MCNC: 8.6 G/DL (ref 6–8.4)
PROT UR QL STRIP: NEGATIVE
RBC # BLD AUTO: 4.95 M/UL (ref 4–5.4)
SODIUM SERPL-SCNC: 138 MMOL/L (ref 136–145)
SP GR UR STRIP: 1 (ref 1–1.03)
SQUAMOUS #/AREA URNS HPF: 2 /HPF
URN SPEC COLLECT METH UR: ABNORMAL
UROBILINOGEN UR STRIP-ACNC: NEGATIVE EU/DL
WBC # BLD AUTO: 6.31 K/UL (ref 3.9–12.7)
WBC #/AREA URNS HPF: 0 /HPF (ref 0–5)

## 2023-06-15 PROCEDURE — 80053 COMPREHEN METABOLIC PANEL: CPT | Performed by: EMERGENCY MEDICINE

## 2023-06-15 PROCEDURE — 85025 COMPLETE CBC W/AUTO DIFF WBC: CPT | Performed by: EMERGENCY MEDICINE

## 2023-06-15 PROCEDURE — 63600175 PHARM REV CODE 636 W HCPCS: Performed by: EMERGENCY MEDICINE

## 2023-06-15 PROCEDURE — 96375 TX/PRO/DX INJ NEW DRUG ADDON: CPT

## 2023-06-15 PROCEDURE — 81000 URINALYSIS NONAUTO W/SCOPE: CPT | Performed by: EMERGENCY MEDICINE

## 2023-06-15 PROCEDURE — 81025 URINE PREGNANCY TEST: CPT | Performed by: EMERGENCY MEDICINE

## 2023-06-15 PROCEDURE — 96374 THER/PROPH/DIAG INJ IV PUSH: CPT

## 2023-06-15 PROCEDURE — 99285 EMERGENCY DEPT VISIT HI MDM: CPT | Mod: 25

## 2023-06-15 PROCEDURE — 83690 ASSAY OF LIPASE: CPT | Performed by: EMERGENCY MEDICINE

## 2023-06-15 RX ORDER — HYDROCODONE BITARTRATE AND ACETAMINOPHEN 5; 325 MG/1; MG/1
1 TABLET ORAL EVERY 6 HOURS PRN
Qty: 12 TABLET | Refills: 0 | Status: SHIPPED | OUTPATIENT
Start: 2023-06-15 | End: 2023-06-25

## 2023-06-15 RX ORDER — KETOROLAC TROMETHAMINE 30 MG/ML
15 INJECTION, SOLUTION INTRAMUSCULAR; INTRAVENOUS
Status: COMPLETED | OUTPATIENT
Start: 2023-06-15 | End: 2023-06-15

## 2023-06-15 RX ORDER — HYDROMORPHONE HYDROCHLORIDE 2 MG/ML
1 INJECTION, SOLUTION INTRAMUSCULAR; INTRAVENOUS; SUBCUTANEOUS
Status: COMPLETED | OUTPATIENT
Start: 2023-06-15 | End: 2023-06-15

## 2023-06-15 RX ORDER — ONDANSETRON 2 MG/ML
4 INJECTION INTRAMUSCULAR; INTRAVENOUS
Status: COMPLETED | OUTPATIENT
Start: 2023-06-15 | End: 2023-06-15

## 2023-06-15 RX ORDER — METOCLOPRAMIDE HYDROCHLORIDE 5 MG/ML
20 INJECTION INTRAMUSCULAR; INTRAVENOUS
Status: COMPLETED | OUTPATIENT
Start: 2023-06-15 | End: 2023-06-15

## 2023-06-15 RX ADMIN — KETOROLAC TROMETHAMINE 15 MG: 30 INJECTION, SOLUTION INTRAMUSCULAR; INTRAVENOUS at 11:06

## 2023-06-15 RX ADMIN — ONDANSETRON 4 MG: 2 INJECTION INTRAMUSCULAR; INTRAVENOUS at 08:06

## 2023-06-15 RX ADMIN — METOCLOPRAMIDE 20 MG: 5 INJECTION, SOLUTION INTRAMUSCULAR; INTRAVENOUS at 11:06

## 2023-06-15 RX ADMIN — HYDROMORPHONE HYDROCHLORIDE 1 MG: 2 INJECTION INTRAMUSCULAR; INTRAVENOUS; SUBCUTANEOUS at 08:06

## 2023-06-16 NOTE — ED PROVIDER NOTES
SCRIBE #1 NOTE: I, Lona Branch am scribing for, and in the presence of, Barrington Burt Jr., MD. I have scribed the HPI, ROS, and PEx.     SCRIBE #2 NOTE: I, Chikis Sanchez, am scribing for, and in the presence of,  Boone Mckinney MD. I have scribed the remaining portions of the note not scribed by Scribe #1.   History      Chief Complaint   Patient presents with    Flank Pain     R sided pain/pressure since this morning; denies urinary complaints. Hx renal stent       Review of patient's allergies indicates:  No Known Allergies     HPI   HPI    6/15/2023, 7:35 PM   History obtained from the patient      History of Present Illness: Shun Perez is a 22 y.o. female patient with a PMHx of kidney stones who presents to the Emergency Department for R flank pain which was present when she woke up this morning. Symptoms are constant and moderate in severity. No mitigating or exacerbating factors reported. Associated sxs include chills. Patient denies any fever, dysuria, hematuria, N/V, abdominal pain, weakness, and all other sxs at this time. No prior Tx reported. Pt reports that she has had renal stents in the past, but does not have a renal stent at this time. No further complaints or concerns at this time.         Arrival mode: Personal vehicle      PCP: Primary Doctor No       Past Medical History:  No past medical history on file.    Past Surgical History:  No past surgical history on file.      Family History:  No family history on file.    Social History:  Social History     Tobacco Use    Smoking status: Not on file    Smokeless tobacco: Not on file   Substance and Sexual Activity    Alcohol use: Not on file    Drug use: Not on file    Sexual activity: Not on file       ROS   Review of Systems   Constitutional:  Positive for chills. Negative for fever.   HENT:  Negative for sore throat.    Respiratory:  Negative for shortness of breath.    Cardiovascular:  Negative for chest pain.   Gastrointestinal:   "Negative for abdominal pain, nausea and vomiting.   Genitourinary:  Positive for flank pain (R). Negative for dysuria and hematuria.   Musculoskeletal:  Negative for back pain.   Skin:  Negative for rash.   Neurological:  Negative for weakness.   Hematological:  Does not bruise/bleed easily.   All other systems reviewed and are negative.    Physical Exam      Initial Vitals   BP Pulse Resp Temp SpO2   06/15/23 1817 06/15/23 1817 06/15/23 1817 06/15/23 1819 06/15/23 1817   122/79 88 19 99.6 °F (37.6 °C) 99 %      MAP       --                 Physical Exam  Nursing Notes and Vital Signs Reviewed.  Constitutional: Patient is in no acute distress. Well-developed and well-nourished.  Head: Atraumatic. Normocephalic.  Eyes:  EOM intact.  No scleral icterus.  ENT: Mucous membranes are moist.  Nares clear   Neck:  Full ROM. No JVD.  Cardiovascular: Regular rate. Regular rhythm No murmurs, rubs, or gallops. Distal pulses are 2+ and symmetric  Pulmonary/Chest: No respiratory distress. Clear to auscultation bilaterally. No wheezing or rales.  Equal chest wall rise bilaterally  Abdominal: Soft and non-distended.  There is no tenderness.  No rebound, guarding, or rigidity. Good bowel sounds.  Genitourinary: No CVA tenderness.  No suprapubic tenderness  Musculoskeletal: Moves all extremities. No obvious deformities.  5 x 5 strength in all extremities   Skin: Warm and dry.  Neurological:  Alert, awake, and appropriate.  Normal speech.  No acute focal neurological deficits are appreciated.  Two through 12 intact bilaterally.  Psychiatric: Normal affect. Good eye contact. Appropriate in content.      ED Course    Procedures  ED Vital Signs:  Vitals:    06/15/23 1816 06/15/23 1817 06/15/23 1819 06/15/23 1829   BP:  122/79     Pulse:  88     Resp:  19     Temp:   99.6 °F (37.6 °C)    TempSrc:   Oral    SpO2:  99%     Weight: 56.3 kg (124 lb 1.9 oz)      Height:    4' 11" (1.499 m)    06/15/23 2014 06/15/23 2031   BP:  108/62   Pulse:  " 81   Resp: 20 18   Temp:  97.7 °F (36.5 °C)   TempSrc:  Oral   SpO2:  100%   Weight:     Height:         Abnormal Lab Results:  Labs Reviewed   COMPREHENSIVE METABOLIC PANEL - Abnormal; Notable for the following components:       Result Value    CO2 20 (*)     Total Protein 8.6 (*)     All other components within normal limits   URINALYSIS, REFLEX TO URINE CULTURE - Abnormal; Notable for the following components:    Color, UA Colorless (*)     Leukocytes, UA 1+ (*)     All other components within normal limits    Narrative:     Specimen Source->Urine   URINALYSIS MICROSCOPIC - Abnormal; Notable for the following components:    Bacteria Few (*)     All other components within normal limits    Narrative:     Specimen Source->Urine   CBC W/ AUTO DIFFERENTIAL   LIPASE   PREGNANCY TEST, URINE RAPID    Narrative:     Specimen Source->Urine   DRUG SCREEN PANEL, URINE EMERGENCY   DRUG SCREEN PANEL, URINE EMERGENCY        All Lab Results:  Results for orders placed or performed during the hospital encounter of 06/15/23   CBC auto differential   Result Value Ref Range    WBC 6.31 3.90 - 12.70 K/uL    RBC 4.95 4.00 - 5.40 M/uL    Hemoglobin 14.2 12.0 - 16.0 g/dL    Hematocrit 42.6 37.0 - 48.5 %    MCV 86 82 - 98 fL    MCH 28.7 27.0 - 31.0 pg    MCHC 33.3 32.0 - 36.0 g/dL    RDW 12.0 11.5 - 14.5 %    Platelets 271 150 - 450 K/uL    MPV 11.1 9.2 - 12.9 fL    Immature Granulocytes 0.2 0.0 - 0.5 %    Gran # (ANC) 4.4 1.8 - 7.7 K/uL    Immature Grans (Abs) 0.01 0.00 - 0.04 K/uL    Lymph # 1.5 1.0 - 4.8 K/uL    Mono # 0.3 0.3 - 1.0 K/uL    Eos # 0.0 0.0 - 0.5 K/uL    Baso # 0.02 0.00 - 0.20 K/uL    nRBC 0 0 /100 WBC    Gran % 70.0 38.0 - 73.0 %    Lymph % 23.9 18.0 - 48.0 %    Mono % 5.1 4.0 - 15.0 %    Eosinophil % 0.5 0.0 - 8.0 %    Basophil % 0.3 0.0 - 1.9 %    Differential Method Automated    Comprehensive metabolic panel   Result Value Ref Range    Sodium 138 136 - 145 mmol/L    Potassium 3.8 3.5 - 5.1 mmol/L    Chloride 108 95  - 110 mmol/L    CO2 20 (L) 23 - 29 mmol/L    Glucose 93 70 - 110 mg/dL    BUN 8 6 - 20 mg/dL    Creatinine 0.8 0.5 - 1.4 mg/dL    Calcium 9.4 8.7 - 10.5 mg/dL    Total Protein 8.6 (H) 6.0 - 8.4 g/dL    Albumin 4.6 3.5 - 5.2 g/dL    Total Bilirubin 0.4 0.1 - 1.0 mg/dL    Alkaline Phosphatase 74 55 - 135 U/L    AST 19 10 - 40 U/L    ALT 14 10 - 44 U/L    Anion Gap 10 8 - 16 mmol/L    eGFR >60 >60 mL/min/1.73 m^2   Lipase   Result Value Ref Range    Lipase 17 4 - 60 U/L   Urinalysis, Reflex to Urine Culture Urine, Clean Catch    Specimen: Urine   Result Value Ref Range    Specimen UA Urine, Clean Catch     Color, UA Colorless (A) Yellow, Straw, Piedad    Appearance, UA Clear Clear    pH, UA 7.0 5.0 - 8.0    Specific Gravity, UA 1.005 1.005 - 1.030    Protein, UA Negative Negative    Glucose, UA Negative Negative    Ketones, UA Negative Negative    Bilirubin (UA) Negative Negative    Occult Blood UA Negative Negative    Nitrite, UA Negative Negative    Urobilinogen, UA Negative <2.0 EU/dL    Leukocytes, UA 1+ (A) Negative   Pregnancy, urine rapid   Result Value Ref Range    Preg Test, Ur Negative    Urinalysis Microscopic   Result Value Ref Range    WBC, UA 0 0 - 5 /hpf    Bacteria Few (A) None-Occ /hpf    Squam Epithel, UA 2 /hpf    Microscopic Comment SEE COMMENT          Imaging Results:  Imaging Results              CT Renal Stone Study ABD Pelvis WO (Final result)  Result time 06/15/23 20:30:07      Final result by Kathe العلي MD (06/15/23 20:30:07)                   Impression:      Small 2-3 mm calcified stone suspected at the left UVJ without obstructive findings      Electronically signed by: Kathe العلي  Date:    06/15/2023  Time:    20:30               Narrative:    EXAMINATION:  CT RENAL STONE STUDY ABD PELVIS WO    CLINICAL HISTORY:  Flank pain, kidney stone suspected;    TECHNIQUE:  Low dose axial images, sagittal and coronal reformations were obtained from the lung bases to the pubic  symphysis.  Contrast was not administered.    COMPARISON:  None    FINDINGS:  There may be an nonobstructing stone at the left UVJ particularly if clinical symptoms correlate.  No hydronephrosis.  No additional intrarenal sizable calcification.    Urinary bladder decompressed    Liver, pancreas and spleen unremarkable.  Gallbladder present    No obstructive or inflammatory bowel findings.  Appendix normal caliber.  Scant low-density pelvic ascites                                              The Emergency Provider reviewed the vital signs and test results, which are outlined above.    ED Discussion     8:00 PM: Dr. Burt transfers care of patient to Dr. Mckinney pending lab and radiology results.    9:34 PM: Dr. Mckinney agrees with Dr. Burt's assessment and plan of care. Evaluated pt. Pt is resting comfortably and is in no acute distress.  D/w pt all pertinent results. D/w pt any concerns expressed at this time. Answered all questions. Pt expresses understanding at this time.    10:11 PM: Reassessed pt at this time. Discussed with pt all pertinent ED information and results. Discussed pt dx and plan of tx. Gave pt all f/u and return to the ED instructions. All questions and concerns were addressed at this time. Pt expresses understanding of information and instructions, and is comfortable with plan to discharge. Pt is stable for discharge.    I discussed with patient and/or family/caretaker that evaluation in the ED does not suggest any emergent or life threatening medical conditions requiring immediate intervention beyond what was provided in the ED, and I believe patient is safe for discharge.  Regardless, an unremarkable evaluation in the ED does not preclude the development or presence of a serious of life threatening condition. As such, patient was instructed to return immediately for any worsening or change in current symptoms.           ED Medication(s):  Medications   ondansetron injection 4 mg (4 mg  Intravenous Given 6/15/23 2014)   HYDROmorphone (PF) injection 1 mg (1 mg Intravenous Given 6/15/23 2014)   metoclopramide HCl injection 20 mg (20 mg Intravenous Given by Other 6/15/23 2305)   ketorolac injection 15 mg (15 mg Intravenous Given by Other 6/15/23 2304)        Follow-up Information       [facility] In 4 days.    Contact information:  [address] [facility] [address] [phone]               [facility] - Emergency Dept..    Specialty: Emergency Medicine  Why: As needed, If symptoms worsen  Contact information:  [address] [facility] [id number] [phone]                           Discharge Medication List as of 6/15/2023 10:42 PM        START taking these medications    Details   HYDROcodone-acetaminophen (NORCO) 5-325 mg per tablet Take 1 tablet by mouth every 6 (six) hours as needed for Pain., Starting Thu 6/15/2023, Until Sun 6/25/2023 at 2358, Print               Medical Decision Making    Medical Decision Making:   Clinical Tests:   Lab Tests: Ordered and Reviewed  Radiological Study: Ordered and Reviewed         Scribe Attestation:   Scribe #1: I performed the above scribed service and the documentation accurately describes the services I performed. I attest to the accuracy of the note.    Attending:   Physician Attestation Statement for Scribe #1: I, Barrington Burt Jr., MD, personally performed the services described in this documentation, as scribed by Lona Branch, in my presence, and it is both accurate and complete.       Scribe Attestation:   Scribe #2: I performed the above scribed service and the documentation accurately describes the services I performed. I attest to the accuracy of the note.    Attending Attestation:           Physician Attestation for Scribe:    Physician Attestation Statement for Scribe #2: I, Boone Mckinney MD, reviewed documentation, as scribed by Chikis Sanchez in my presence, and it is both accurate and complete. I  also acknowledge and confirm the content of the note done by Husseinibredd #1.        Clinical Impression       ICD-10-CM ICD-9-CM   1. Right flank pain  R10.9 789.09       Disposition:   Disposition: Discharged  Condition: Stable       Boone Mckinney MD  06/16/23 0111

## 2023-07-14 ENCOUNTER — HOSPITAL ENCOUNTER (EMERGENCY)
Facility: HOSPITAL | Age: 23
Discharge: HOME | End: 2023-07-14
Attending: EMERGENCY MEDICINE
Payer: MEDICAID

## 2023-07-14 VITALS
SYSTOLIC BLOOD PRESSURE: 136 MMHG | HEIGHT: 58 IN | BODY MASS INDEX: 25.66 KG/M2 | RESPIRATION RATE: 16 BRPM | DIASTOLIC BLOOD PRESSURE: 65 MMHG | OXYGEN SATURATION: 98 % | TEMPERATURE: 98 F | WEIGHT: 122.25 LBS | HEART RATE: 84 BPM

## 2023-07-14 DIAGNOSIS — F41.9 ANXIETY: Primary | ICD-10-CM

## 2023-07-14 PROCEDURE — 99283 EMERGENCY DEPT VISIT LOW MDM: CPT

## 2023-07-14 RX ORDER — CLONAZEPAM 1 MG/1
TABLET ORAL
Qty: 11 TABLET | Refills: 0 | Status: SHIPPED | OUTPATIENT
Start: 2023-07-14

## 2023-07-14 NOTE — ED PROVIDER NOTES
Encounter Date: 7/14/2023       History     Chief Complaint   Patient presents with    Agitation     Pt. Reports increased agitation and restlessness since 6/15/23, when she states that she was seen in this ER. Cannot find record of that visit in [facility], but pt. States that symptoms started after she was given a medication for her stomach during that visit.     22-year-old female with complaint of agitation and occasional shaking ever since she received an IV medication in the emergency room 1 month ago.  Patient also reports he is having difficulty sleeping.  Patient denies chest pain.  Patient denies shortness of breath.  Patient denies any other complaints.      Review of patient's allergies indicates:   Allergen Reactions    Latex Itching and Other (See Comments)     Redness     Past Medical History:   Diagnosis Date    ADHD (attention deficit hyperactivity disorder)     Anxiety     Anxiety state 11/14/2016 8:45:01 AM    [facility]: Anxiety-No Additional Notes    Anxiety state 11/14/2016 8:45:01 AM    [facility]: Anxiety-No Additional Notes    Calculus of kidney 8/20/2018 11:46:04 AM    [facility] - Unknown: Kidney stones, calcium oxalate-No Additional Notes    Calculus of kidney 8/20/2018 11:46:04 AM    [facility] - Unknown: Kidney stones, calcium oxalate-No Additional Notes    Depression     History of chlamydia     Infection of kidney 8/20/2018 11:46:21 AM    [facility]: Kidney Infection-No Additional Notes    Infection of kidney 8/20/2018 11:46:21 AM    [facility]: Kidney Infection-No Additional Notes    Lactose intolerance     Migraine headache     Other and unspecified ovarian cyst 6/27/2016 12:55:46 PM    [facility]: Ovarian Cyst-No Additional Notes    Other and unspecified ovarian cyst 6/27/2016 12:55:46 PM    [facility]: Ovarian Cyst-No Additional Notes    Other  depressive disorder 11/14/2016 8:45:02 AM    [facility]: Depression-No Additional Notes    Other depressive disorder 11/14/2016 8:45:02 AM    [facility] [place]: Depression-No Additional Notes    Renal disorder     kidney stones    TIA (transient ischemic attack)     Unspecified chlamydial infection, in conditions classified elsewhere and of unspecified site 9/28/2017 3:59:08 PM    [facility]: Chlamydia-No Additional Notes    Unspecified chlamydial infection, in conditions classified elsewhere and of unspecified site 9/28/2017 3:59:08 PM    [facility]: Chlamydia-No Additional Notes    Urinary tract infection 8/20/2018 11:46:40 AM    [facility]: Urinary Tract Infection-No Additional Notes    Urinary tract infection 8/20/2018 11:46:40 AM    [facility] - [facility]: Urinary Tract Infection-No Additional Notes     Past Surgical History:   Procedure Laterality Date    blood vessel removed from chest      BREAST SURGERY  03/22/2019    kidney stent      Mirena      placed 2 years ago     Family History   Problem Relation Age of Onset    Breast cancer Paternal Grandmother     Breast cancer Paternal Aunt     Colon cancer Neg Hx     Ovarian cancer Neg Hx      Social History     Tobacco Use    Smoking status: Some Days     Packs/day: 0.50     Types: Vaping with nicotine, Cigarettes    Smokeless tobacco: Current   Substance Use Topics    Alcohol use: No    Drug use: No     Review of Systems   Constitutional:  Negative for fever.   HENT:  Negative for sore throat.    Respiratory:  Negative for shortness of breath.    Cardiovascular:  Negative for chest pain.   Gastrointestinal:  Negative for nausea.   Genitourinary:  Negative for dysuria.   Musculoskeletal:  Negative for back pain.   Skin:  Negative for rash.   Neurological:  Negative for weakness.        Anxiety   Hematological:  Does not bruise/bleed easily.     Physical  Exam     Initial Vitals [07/14/23 1310]   BP Pulse Resp Temp SpO2   136/65 84 16 98.2 °F (36.8 °C) 98 %      MAP       --         Physical Exam    Nursing note and vitals reviewed.  Constitutional: She appears well-developed and well-nourished.   HENT:   Head: Normocephalic and atraumatic.   Eyes: Conjunctivae and EOM are normal. Pupils are equal, round, and reactive to light.   Neck: Neck supple.   Normal range of motion.  Cardiovascular:  Normal rate, regular rhythm, normal heart sounds and intact distal pulses.           Pulmonary/Chest: Breath sounds normal.   Abdominal: Abdomen is soft. There is no abdominal tenderness. There is no rebound and no guarding.   Musculoskeletal:         General: Normal range of motion.      Cervical back: Normal range of motion and neck supple.     Neurological: She is alert and oriented to person, place, and time. She has normal strength and normal reflexes.   Skin: Skin is warm and dry.   Psychiatric: She has a normal mood and affect. Her behavior is normal. Thought content normal.       ED Course   Procedures  Labs Reviewed   HIV 1 / 2 ANTIBODY   HEPATITIS C ANTIBODY   HEP C VIRUS HOLD SPECIMEN          Imaging Results    None          Medications - No data to display                           Clinical Impression:   Final diagnoses:  [F41.9] Anxiety (Primary)        ED Disposition Condition    Discharge Stable          ED Prescriptions       Medication Sig Dispense Start Date End Date Auth. Provider    clonazePAM (KLONOPIN) 1 MG tablet 1 PO BID X 3 days, then 1 PO every night X 3 days, then 1/2 PO every night X 4 days 11 tablet 7/14/2023 -- Lc Abbott NP          Follow-up Information       Follow up With Specialties Details Why Contact Info    Erika Blackwell MD Family Medicine Schedule an appointment as soon as possible for a visit  As needed [address] [facility] [address]  [facility] [address]  [phone]               Lc Abbott NP  07/14/23 0625

## 2023-11-10 PROCEDURE — 99285 EMERGENCY DEPT VISIT HI MDM: CPT | Mod: 25

## 2023-11-10 PROCEDURE — 96361 HYDRATE IV INFUSION ADD-ON: CPT

## 2023-11-10 PROCEDURE — 96374 THER/PROPH/DIAG INJ IV PUSH: CPT

## 2023-11-10 PROCEDURE — 96375 TX/PRO/DX INJ NEW DRUG ADDON: CPT

## 2023-11-10 PROCEDURE — 96376 TX/PRO/DX INJ SAME DRUG ADON: CPT

## 2023-11-11 ENCOUNTER — HOSPITAL ENCOUNTER (EMERGENCY)
Facility: HOSPITAL | Age: 23
Discharge: HOME | End: 2023-11-11
Attending: EMERGENCY MEDICINE
Payer: MEDICAID

## 2023-11-11 VITALS
BODY MASS INDEX: 24.69 KG/M2 | OXYGEN SATURATION: 100 % | TEMPERATURE: 98 F | HEIGHT: 59 IN | DIASTOLIC BLOOD PRESSURE: 62 MMHG | SYSTOLIC BLOOD PRESSURE: 108 MMHG | HEART RATE: 94 BPM | RESPIRATION RATE: 18 BRPM

## 2023-11-11 DIAGNOSIS — J06.9 UPPER RESPIRATORY TRACT INFECTION, UNSPECIFIED TYPE: ICD-10-CM

## 2023-11-11 DIAGNOSIS — R07.9 CHEST PAIN: ICD-10-CM

## 2023-11-11 DIAGNOSIS — F41.1 ANXIETY REACTION: Primary | ICD-10-CM

## 2023-11-11 LAB
ALBUMIN SERPL BCP-MCNC: 3.7 G/DL (ref 3.5–5.2)
ALP SERPL-CCNC: 66 U/L (ref 55–135)
ALT SERPL W/O P-5'-P-CCNC: 12 U/L (ref 10–44)
ANION GAP SERPL CALC-SCNC: 13 MMOL/L (ref 8–16)
AST SERPL-CCNC: 14 U/L (ref 10–40)
B-HCG UR QL: NEGATIVE
BASOPHILS # BLD AUTO: 0.01 K/UL (ref 0–0.2)
BASOPHILS NFR BLD: 0.1 % (ref 0–1.9)
BILIRUB SERPL-MCNC: 0.3 MG/DL (ref 0.1–1)
BNP SERPL-MCNC: <10 PG/ML (ref 0–99)
BUN SERPL-MCNC: 13 MG/DL (ref 6–20)
CALCIUM SERPL-MCNC: 8.8 MG/DL (ref 8.7–10.5)
CHLORIDE SERPL-SCNC: 111 MMOL/L (ref 95–110)
CO2 SERPL-SCNC: 16 MMOL/L (ref 23–29)
CREAT SERPL-MCNC: 0.7 MG/DL (ref 0.5–1.4)
DIFFERENTIAL METHOD: ABNORMAL
EOSINOPHIL # BLD AUTO: 0 K/UL (ref 0–0.5)
EOSINOPHIL NFR BLD: 0 % (ref 0–8)
ERYTHROCYTE [DISTWIDTH] IN BLOOD BY AUTOMATED COUNT: 11.8 % (ref 11.5–14.5)
EST. GFR  (NO RACE VARIABLE): >60 ML/MIN/1.73 M^2
GLUCOSE SERPL-MCNC: 104 MG/DL (ref 70–110)
HCT VFR BLD AUTO: 34.6 % (ref 37–48.5)
HGB BLD-MCNC: 11.9 G/DL (ref 12–16)
IMM GRANULOCYTES # BLD AUTO: 0.03 K/UL (ref 0–0.04)
IMM GRANULOCYTES NFR BLD AUTO: 0.3 % (ref 0–0.5)
LYMPHOCYTES # BLD AUTO: 1.2 K/UL (ref 1–4.8)
LYMPHOCYTES NFR BLD: 11.5 % (ref 18–48)
MCH RBC QN AUTO: 29.5 PG (ref 27–31)
MCHC RBC AUTO-ENTMCNC: 34.4 G/DL (ref 32–36)
MCV RBC AUTO: 86 FL (ref 82–98)
MONOCYTES # BLD AUTO: 0.7 K/UL (ref 0.3–1)
MONOCYTES NFR BLD: 6.8 % (ref 4–15)
NEUTROPHILS # BLD AUTO: 8.4 K/UL (ref 1.8–7.7)
NEUTROPHILS NFR BLD: 81.3 % (ref 38–73)
NRBC BLD-RTO: 0 /100 WBC
PLATELET # BLD AUTO: 271 K/UL (ref 150–450)
PMV BLD AUTO: 11 FL (ref 9.2–12.9)
POTASSIUM SERPL-SCNC: 3.9 MMOL/L (ref 3.5–5.1)
PROT SERPL-MCNC: 7.3 G/DL (ref 6–8.4)
RBC # BLD AUTO: 4.04 M/UL (ref 4–5.4)
SODIUM SERPL-SCNC: 140 MMOL/L (ref 136–145)
TROPONIN I SERPL DL<=0.01 NG/ML-MCNC: <0.006 NG/ML (ref 0–0.03)
WBC # BLD AUTO: 10.28 K/UL (ref 3.9–12.7)

## 2023-11-11 PROCEDURE — 93010 ELECTROCARDIOGRAM REPORT: CPT | Mod: ,,, | Performed by: INTERNAL MEDICINE

## 2023-11-11 PROCEDURE — 85025 COMPLETE CBC W/AUTO DIFF WBC: CPT | Performed by: EMERGENCY MEDICINE

## 2023-11-11 PROCEDURE — 84484 ASSAY OF TROPONIN QUANT: CPT | Performed by: EMERGENCY MEDICINE

## 2023-11-11 PROCEDURE — 93010 EKG 12-LEAD: ICD-10-PCS | Mod: ,,, | Performed by: INTERNAL MEDICINE

## 2023-11-11 PROCEDURE — 81025 URINE PREGNANCY TEST: CPT | Performed by: EMERGENCY MEDICINE

## 2023-11-11 PROCEDURE — 63600175 PHARM REV CODE 636 W HCPCS: Performed by: EMERGENCY MEDICINE

## 2023-11-11 PROCEDURE — 80053 COMPREHEN METABOLIC PANEL: CPT | Performed by: EMERGENCY MEDICINE

## 2023-11-11 PROCEDURE — 25000003 PHARM REV CODE 250: Performed by: EMERGENCY MEDICINE

## 2023-11-11 PROCEDURE — 83880 ASSAY OF NATRIURETIC PEPTIDE: CPT | Performed by: EMERGENCY MEDICINE

## 2023-11-11 PROCEDURE — 93005 ELECTROCARDIOGRAM TRACING: CPT

## 2023-11-11 RX ORDER — LORAZEPAM 2 MG/ML
1 INJECTION INTRAMUSCULAR
Status: COMPLETED | OUTPATIENT
Start: 2023-11-11 | End: 2023-11-11

## 2023-11-11 RX ORDER — BENZONATATE 100 MG/1
100 CAPSULE ORAL ONCE
Status: COMPLETED | OUTPATIENT
Start: 2023-11-11 | End: 2023-11-11

## 2023-11-11 RX ORDER — BENZONATATE 100 MG/1
100 CAPSULE ORAL 3 TIMES DAILY PRN
Qty: 20 CAPSULE | Refills: 0 | Status: SHIPPED | OUTPATIENT
Start: 2023-11-11 | End: 2023-11-21

## 2023-11-11 RX ORDER — ONDANSETRON 4 MG/1
4 TABLET, ORALLY DISINTEGRATING ORAL EVERY 6 HOURS PRN
Qty: 30 TABLET | Refills: 0 | Status: SHIPPED | OUTPATIENT
Start: 2023-11-11

## 2023-11-11 RX ORDER — ASPIRIN 325 MG
325 TABLET ORAL
Status: COMPLETED | OUTPATIENT
Start: 2023-11-11 | End: 2023-11-11

## 2023-11-11 RX ORDER — ONDANSETRON 2 MG/ML
4 INJECTION INTRAMUSCULAR; INTRAVENOUS
Status: COMPLETED | OUTPATIENT
Start: 2023-11-11 | End: 2023-11-11

## 2023-11-11 RX ADMIN — ONDANSETRON 4 MG: 2 INJECTION INTRAMUSCULAR; INTRAVENOUS at 01:11

## 2023-11-11 RX ADMIN — SODIUM CHLORIDE 1000 ML: 9 INJECTION, SOLUTION INTRAVENOUS at 01:11

## 2023-11-11 RX ADMIN — LORAZEPAM 1 MG: 2 INJECTION INTRAMUSCULAR; INTRAVENOUS at 02:11

## 2023-11-11 RX ADMIN — LORAZEPAM 1 MG: 2 INJECTION INTRAMUSCULAR; INTRAVENOUS at 01:11

## 2023-11-11 RX ADMIN — BENZONATATE 100 MG: 100 CAPSULE ORAL at 01:11

## 2023-11-11 RX ADMIN — ASPIRIN 325 MG ORAL TABLET 325 MG: 325 PILL ORAL at 01:11

## 2023-11-11 NOTE — DISCHARGE INSTRUCTIONS
Regarding UPPER RESPIRATORY ILLNESS, for treatment, I encouraged patient to: drink plenty of fluids; get lots of rest; take medications as prescribed; use over-the-counter medications for symptomatic treatment;  and use a humidifier or steam in the bathroom to improve patency of upper airway.  Patient instructed to notify primary care provider, or return to the emergency department, if the they: have a cough most days or have a cough that returns frequently; begin coughing up blood; develop a high fever or shaking chills; have a low-grade fever for three or more days; develop thick, greenish mucus, especially if it has a bad smell; and experience shortness of breath or chest pain. For prevention, discussed with patient the importance of refraining from smoking (if applicable), getting annual influenza vaccines, reducing exposure to air pollution, and the need to frequently wash hands to avoid spread of infection.     Rest  Drink plenty of clear fluids   Nasal saline spray to clear nasal drainage and help with nasal congestion  Zyrtec or Claritin to help dry mucus and post nasal drip  Mucinex or Mucinex DM for cough and chest congestion  Tylenol or Ibuprofen for fever, headache and body aches  Warm salt water gargles for throat comfort  Chloraseptic spray or lozenges for throat comfort  RTC with no improvement or worsening    Regarding ANXIETY, I discussed signs and symptoms of anxiety with patient including: tachycardia, dysrhythmias, tachypnea, diaphoresis, trembling, dizziness, diarrhea, dry mouth, and difficulty swallowing.  Patient was encouraged to eat a well-balanced, healthy diet; get plenty of rest; exercise daily; limit caffeine and alcohol intake; participate in meditation; talk with family and/or friends about things that may be considered stressful; and keep a diary of feelings and stress triggers.  Patient was instructed to take medications as prescribed and follow up with primary care provider for long  term management. I recommended that the patient return to the emergency department if they: feel lightheaded or too dizzy to stand up; develop feelings that they want to hurt themselves or someone else; or develop chest pain, tightness, or heaviness that radiates to the shoulders, arms, jaw, neck, or back.

## 2023-11-11 NOTE — ED PROVIDER NOTES
"SCRIBE #1 NOTE: I, Maya Brennan, am scribing for, and in the presence of, Brayan Mitchell Jr., MD. I have scribed the entire note.       History     Chief Complaint   Patient presents with    Anxiety     Pt was seen today for a respiratory infection, she was given Prednisone. She is now anxious and c/o a pressure like pain in the arms and legs. Pain rating 10/10.      Review of patient's allergies indicates:   Allergen Reactions    Latex Itching and Other (See Comments)     Redness         History of Present Illness     HPI    11/11/2023, 2:00 AM  History obtained from the patient      History of Present Illness: Ruslan Perez is a 23 y.o. female patient with a PMHx of TIA, anxiety, and depression who presents to the Emergency Department for evaluation of anxiety which onset Friday (11/10/23) around 12 PM. Pt stated she went to Urgent Care this morning and was given 40 mg prednisone. She c/o being sick for 2 weeks. Around 12 PM, she felt like her heart rate went up and felt "pressure in her muscles." She then took .25 mg Klonopin with no relief.  She was prescribed Promethazine and Amoxacillin by urgent care, but has not taken either. Pt reports she smokes 1 pack of cigarettes every 2 days. Symptoms are constant and moderate in severity. No mitigating or exacerbating factors reported. Associated sxs include congestion, cough, anxiety, sleep disturbance, and nausea. Patient denies any dysuria, hematuria, and all other sxs at this time. Prior Tx includes Klonopin. No further complaints or concerns at this time.       Arrival mode: Ambulance Service    PCP: No, Primary Doctor        Past Medical History:  Past Medical History:   Diagnosis Date    ADHD (attention deficit hyperactivity disorder)     Anxiety     Anxiety state 11/14/2016 8:45:01 AM    [facility]: Anxiety-No Additional Notes    Anxiety state 11/14/2016 8:45:01 AM    [facility]: Anxiety-No Additional " Notes    Calculus of kidney 8/20/2018 11:46:04 AM    [facility] - Unknown: Kidney stones, calcium oxalate-No Additional Notes    Calculus of kidney 8/20/2018 11:46:04 AM    [facility] - Unknown: Kidney stones, calcium oxalate-No Additional Notes    Depression     History of chlamydia     Infection of kidney 8/20/2018 11:46:21 AM    [facility] - [facility]: Kidney Infection-No Additional Notes    Infection of kidney 8/20/2018 11:46:21 AM    [facility] - [facility]: Kidney Infection-No Additional Notes    Lactose intolerance     Migraine headache     Other and unspecified ovarian cyst 6/27/2016 12:55:46 PM    [facility]: Ovarian Cyst-No Additional Notes    Other and unspecified ovarian cyst 6/27/2016 12:55:46 PM    [facility] - [facility]: Ovarian Cyst-No Additional Notes    Other depressive disorder 11/14/2016 8:45:02 AM    [facility]: Depression-No Additional Notes    Other depressive disorder 11/14/2016 8:45:02 AM    [facility]: Depression-No Additional Notes    Renal disorder     kidney stones    TIA (transient ischemic attack)     Unspecified chlamydial infection, in conditions classified elsewhere and of unspecified site 9/28/2017 3:59:08 PM    [facility]: Chlamydia-No Additional Notes    Unspecified chlamydial infection, in conditions classified elsewhere and of unspecified site 9/28/2017 3:59:08 PM    [facility]: Chlamydia-No Additional Notes    Urinary tract infection 8/20/2018 11:46:40 AM    [facility] - Urology: Urinary Tract Infection-No Additional Notes    Urinary tract infection 8/20/2018 11:46:40 AM    [facility] - Urology: Urinary Tract Infection-No Additional Notes       Past Surgical History:  Past Surgical History:   Procedure Laterality Date    blood vessel removed from chest      BREAST SURGERY  03/22/2019    kidney stent       "Mirena      placed 2 years ago         Family History:  Family History   Problem Relation Age of Onset    Breast cancer Paternal Grandmother     Breast cancer Paternal Aunt     Colon cancer Neg Hx     Ovarian cancer Neg Hx        Social History:  Social History     Tobacco Use    Smoking status: Some Days     Current packs/day: 0.50     Types: Vaping with nicotine, Cigarettes    Smokeless tobacco: Current   Substance and Sexual Activity    Alcohol use: No    Drug use: No    Sexual activity: Yes     Partners: Male     Birth control/protection: None        Review of Systems     Review of Systems   Constitutional:  Negative for fever.   HENT:  Positive for congestion. Negative for sore throat.    Respiratory:  Positive for cough. Negative for shortness of breath.    Cardiovascular:  Negative for chest pain.   Gastrointestinal:  Positive for nausea.   Genitourinary:  Negative for dysuria and hematuria.   Musculoskeletal:  Negative for back pain.        (+) "pressure in muscles"   Skin:  Negative for rash.   Neurological:  Negative for weakness.   Hematological:  Does not bruise/bleed easily.   Psychiatric/Behavioral:  Positive for sleep disturbance.         (+) anxiety        Physical Exam     Initial Vitals [11/10/23 2319]   BP Pulse Resp Temp SpO2   (!) 115/54 102 20 98.3 °F (36.8 °C) 96 %      MAP       --          Physical Exam  Nursing Notes and Vital Signs Reviewed.  Constitutional: Patient is in no acute distress. Well-developed and well-nourished.  Head: Atraumatic. Normocephalic.  Eyes: PERRL. EOM intact. Conjunctivae are not pale. No scleral icterus.  ENT: Mucous membranes are moist. Erythematous oropharynx. No tonsillar exudates. Airway intact, able to manage oral secretions.  Neck: Supple. Full ROM. No lymphadenopathy.  Cardiovascular: Tachycardic. Regular rhythm. No murmurs, rubs, or gallops. Distal pulses are 2+ and symmetric.  Pulmonary/Chest: No respiratory distress. Clear to auscultation bilaterally. No " "wheezing or rales.  Abdominal: Soft and non-distended.  There is no tenderness.  No rebound, guarding, or rigidity. Good bowel sounds.  Genitourinary: No CVA tenderness  Musculoskeletal: Moves all extremities. No obvious deformities. No edema. No calf tenderness.  Skin: Warm and dry.  Neurological:  Alert, awake, and appropriate.  Normal speech.  No acute focal neurological deficits are appreciated.  Psychiatric: Normal affect. Good eye contact. Appropriate in content. Denies HI/SI.     ED Course   Procedures  ED Vital Signs:  Vitals:    11/10/23 2319 11/11/23 0139 11/11/23 0200 11/11/23 0230   BP: (!) 115/54  119/71 113/65   Pulse: 102 74 76 106   Resp: 20  20 (!) 25   Temp: 98.3 °F (36.8 °C)      TempSrc: Oral      SpO2: 96%  98% 100%   Height: 4' 11" (1.499 m)       11/11/23 0412   BP: 108/62   Pulse: 94   Resp: 18   Temp: 98.2 °F (36.8 °C)   TempSrc:    SpO2: 100%   Height:        Abnormal Lab Results:  Labs Reviewed   CBC W/ AUTO DIFFERENTIAL - Abnormal; Notable for the following components:       Result Value    Hemoglobin 11.9 (*)     Hematocrit 34.6 (*)     Gran # (ANC) 8.4 (*)     Gran % 81.3 (*)     Lymph % 11.5 (*)     All other components within normal limits   COMPREHENSIVE METABOLIC PANEL - Abnormal; Notable for the following components:    Chloride 111 (*)     CO2 16 (*)     All other components within normal limits   TROPONIN I   B-TYPE NATRIURETIC PEPTIDE   PREGNANCY TEST, URINE RAPID    Narrative:     Specimen Source->Urine        All Lab Results:  Results for orders placed or performed during the hospital encounter of 11/11/23   CBC auto differential   Result Value Ref Range    WBC 10.28 3.90 - 12.70 K/uL    RBC 4.04 4.00 - 5.40 M/uL    Hemoglobin 11.9 (L) 12.0 - 16.0 g/dL    Hematocrit 34.6 (L) 37.0 - 48.5 %    MCV 86 82 - 98 fL    MCH 29.5 27.0 - 31.0 pg    MCHC 34.4 32.0 - 36.0 g/dL    RDW 11.8 11.5 - 14.5 %    Platelets 271 150 - 450 K/uL    MPV 11.0 9.2 - 12.9 fL    Immature Granulocytes 0.3 " 0.0 - 0.5 %    Gran # (ANC) 8.4 (H) 1.8 - 7.7 K/uL    Immature Grans (Abs) 0.03 0.00 - 0.04 K/uL    Lymph # 1.2 1.0 - 4.8 K/uL    Mono # 0.7 0.3 - 1.0 K/uL    Eos # 0.0 0.0 - 0.5 K/uL    Baso # 0.01 0.00 - 0.20 K/uL    nRBC 0 0 /100 WBC    Gran % 81.3 (H) 38.0 - 73.0 %    Lymph % 11.5 (L) 18.0 - 48.0 %    Mono % 6.8 4.0 - 15.0 %    Eosinophil % 0.0 0.0 - 8.0 %    Basophil % 0.1 0.0 - 1.9 %    Differential Method Automated    Comprehensive metabolic panel   Result Value Ref Range    Sodium 140 136 - 145 mmol/L    Potassium 3.9 3.5 - 5.1 mmol/L    Chloride 111 (H) 95 - 110 mmol/L    CO2 16 (L) 23 - 29 mmol/L    Glucose 104 70 - 110 mg/dL    BUN 13 6 - 20 mg/dL    Creatinine 0.7 0.5 - 1.4 mg/dL    Calcium 8.8 8.7 - 10.5 mg/dL    Total Protein 7.3 6.0 - 8.4 g/dL    Albumin 3.7 3.5 - 5.2 g/dL    Total Bilirubin 0.3 0.1 - 1.0 mg/dL    Alkaline Phosphatase 66 55 - 135 U/L    AST 14 10 - 40 U/L    ALT 12 10 - 44 U/L    eGFR >60 >60 mL/min/1.73 m^2    Anion Gap 13 8 - 16 mmol/L   Troponin I #1   Result Value Ref Range    Troponin I <0.006 0.000 - 0.026 ng/mL   BNP   Result Value Ref Range    BNP <10 0 - 99 pg/mL   Pregnancy, urine rapid   Result Value Ref Range    Preg Test, Ur Negative         Imaging Results:  Imaging Results              X-Ray Chest AP Portable (Final result)  Result time 11/11/23 02:00:44      Final result by Tor Stack MD (11/11/23 02:00:44)                   Impression:      No acute findings.      Electronically signed by: Tor Stack  Date:    11/11/2023  Time:    02:00               Narrative:    EXAMINATION:  XR CHEST AP PORTABLE    CLINICAL HISTORY:  cough;    TECHNIQUE:  Single frontal view of the chest was performed.    COMPARISON:  03/25/2022    FINDINGS:  Lungs are clear.  No focal consolidation, pleural fluid, or pneumothorax.  Normal heart size.                                       The EKG was ordered, reviewed, and independently interpreted by the ED  provider.  Interpretation time: 2:03  Rate: 76 BPM  Rhythm: normal sinus rhythm with sinus arrhythmia.  Interpretation: Nonspecific T wave abnormality. No STEMI.           The Emergency Provider reviewed the vital signs and test results, which are outlined above.     ED Discussion     3:55 AM: Reassessed pt at this time. Pt's anxiety has improved while in the ED. Pt requested referral to psych for anxiety. Denies SI, HI. Discussed with pt all pertinent ED information and results. Discussed pt dx and plan of tx. Gave pt all f/u and return to the ED instructions. All questions and concerns were addressed at this time. Pt expresses understanding of information and instructions, and is comfortable with plan to discharge. Pt advised to take amoxacillin as previously prescribed. Dr. Mitchell has additionally prescribed Zofran and Tessalon for symptomatic treatment. Pt is advised to follow up with PCP within a week and return to ED if any issues arise. Pt is stable for discharge.    I discussed with patient and/or family/caretaker that evaluation in the ED does not suggest any emergent or life threatening medical conditions requiring immediate intervention beyond what was provided in the ED, and I believe patient is safe for discharge.  Regardless, an unremarkable evaluation in the ED does not preclude the development or presence of a serious of life threatening condition. As such, patient was instructed to return immediately for any worsening or change in current symptoms.     Regarding UPPER RESPIRATORY ILLNESS, for treatment, I encouraged patient to: drink plenty of fluids; get lots of rest; take medications as prescribed; use over-the-counter medications for symptomatic treatment;  and use a humidifier or steam in the bathroom to improve patency of upper airway.  Patient instructed to notify primary care provider, or return to the emergency department, if the they: have a cough most days or have a cough that returns  frequently; begin coughing up blood; develop a high fever or shaking chills; have a low-grade fever for three or more days; develop thick, greenish mucus, especially if it has a bad smell; and experience shortness of breath or chest pain. For prevention, discussed with patient the importance of refraining from smoking (if applicable), getting annual influenza vaccines, reducing exposure to air pollution, and the need to frequently wash hands to avoid spread of infection.     Rest  Drink plenty of clear fluids   Nasal saline spray to clear nasal drainage and help with nasal congestion  Zyrtec or Claritin to help dry mucus and post nasal drip  Mucinex or Mucinex DM for cough and chest congestion  Tylenol or Ibuprofen for fever, headache and body aches  Warm salt water gargles for throat comfort  Chloraseptic spray or lozenges for throat comfort  RTC with no improvement or worsening    Regarding ANXIETY, I discussed signs and symptoms of anxiety with patient including: tachycardia, dysrhythmias, tachypnea, diaphoresis, trembling, dizziness, diarrhea, dry mouth, and difficulty swallowing.  Patient was encouraged to eat a well-balanced, healthy diet; get plenty of rest; exercise daily; limit caffeine and alcohol intake; participate in meditation; talk with family and/or friends about things that may be considered stressful; and keep a diary of feelings and stress triggers.  Patient was instructed to take medications as prescribed and follow up with primary care provider for long term management. I recommended that the patient return to the emergency department if they: feel lightheaded or too dizzy to stand up; develop feelings that they want to hurt themselves or someone else; or develop chest pain, tightness, or heaviness that radiates to the shoulders, arms, jaw, neck, or back.        Medical Decision Making  Amount and/or Complexity of Data Reviewed  Labs: ordered. Decision-making details documented in ED  Course.  Radiology: ordered. Decision-making details documented in ED Course.  ECG/medicine tests: ordered. Decision-making details documented in ED Course.    Risk  OTC drugs.  Prescription drug management.                ED Medication(s):  Medications   sodium chloride 0.9% bolus 1,000 mL 1,000 mL (0 mLs Intravenous Stopped 11/11/23 0236)   aspirin tablet 325 mg (325 mg Oral Given 11/11/23 0133)   ondansetron injection 4 mg (4 mg Intravenous Given 11/11/23 0129)   LORazepam injection 1 mg (1 mg Intravenous Given 11/11/23 0130)   benzonatate capsule 100 mg (100 mg Oral Given 11/11/23 0142)   LORazepam injection 1 mg (1 mg Intravenous Given 11/11/23 0212)       Discharge Medication List as of 11/11/2023  3:55 AM        START taking these medications    Details   benzonatate (TESSALON) 100 MG capsule Take 1 capsule (100 mg total) by mouth 3 (three) times daily as needed for Cough., Starting Sat 11/11/2023, Until Tue 11/21/2023 at 2359, Print      ondansetron (ZOFRAN-ODT) 4 MG TbDL Take 1 tablet (4 mg total) by mouth every 6 (six) hours as needed., Starting Sat 11/11/2023, Print              Follow-up Information       [facility]. Schedule an appointment as soon as possible for a visit in 1 week.    Contact information:  [address] [facility] [address] [phone]               [facility] Internal Med [facility]. Schedule an appointment as soon as possible for a visit in 1 week.    Specialty: Internal Medicine  Contact information:  [address] [facility] [id number] [phone]  Additional information:  Please park on the [address] side and use the Clinic entrance. Check in on the 2nd floor, to the right.             [facility] Behavioral [facility]. Schedule an appointment as soon as possible for a visit in 1 week.    Specialty: Psychiatry  Contact information:  [address] [facility] [id number] [phone]  Additional information:  Please  park on the [address] side and use the Clnic entrance. Check in on the 2nd floor, to the left.             [facility] - Emergency Dept..    Specialty: Emergency Medicine  Why: As needed, If symptoms worsen  Contact information:  [address]  [facility] [address] [phone]                               Scribe Attestation:   Scribe #1: I performed the above scribed service and the documentation accurately describes the services I performed. I attest to the accuracy of the note.     Attending:   Physician Attestation Statement for Scribe #1: I, Brayan Mitchell Jr., MD, personally performed the services described in this documentation, as scribed by Maya Brennan, in my presence, and it is both accurate and complete.       Scribe Attestation:   Scribe #1: I performed the above scribed service and the documentation accurately describes the services I performed. I attest to the accuracy of the note.         Clinical Impression       ICD-10-CM ICD-9-CM   1. Anxiety reaction  F41.1 300.00   2. Chest pain  R07.9 786.50   3. Upper respiratory tract infection, unspecified type  J06.9 465.9       Disposition:   Disposition: Discharged  Condition: Stable        Brayan Mitchell Jr., MD  11/21/23 0154

## 2023-11-29 ENCOUNTER — OFFICE VISIT (OUTPATIENT)
Dept: OBSTETRICS AND GYNECOLOGY | Facility: CLINIC | Age: 23
End: 2023-11-29
Payer: MEDICAID

## 2023-11-29 VITALS
BODY MASS INDEX: 24.97 KG/M2 | SYSTOLIC BLOOD PRESSURE: 106 MMHG | HEIGHT: 59 IN | WEIGHT: 123.88 LBS | DIASTOLIC BLOOD PRESSURE: 68 MMHG

## 2023-11-29 DIAGNOSIS — L70.9 ACNE, UNSPECIFIED ACNE TYPE: ICD-10-CM

## 2023-11-29 DIAGNOSIS — N83.209 CYST OF OVARY, UNSPECIFIED LATERALITY: ICD-10-CM

## 2023-11-29 DIAGNOSIS — R63.5 WEIGHT GAIN: Primary | ICD-10-CM

## 2023-11-29 DIAGNOSIS — R45.86 MOOD SWINGS: ICD-10-CM

## 2023-11-29 PROCEDURE — 3008F BODY MASS INDEX DOCD: CPT | Mod: CPTII,,, | Performed by: NURSE PRACTITIONER

## 2023-11-29 PROCEDURE — 1160F RVW MEDS BY RX/DR IN RCRD: CPT | Mod: CPTII,,, | Performed by: NURSE PRACTITIONER

## 2023-11-29 PROCEDURE — 99212 PR OFFICE/OUTPT VISIT, EST, LEVL II, 10-19 MIN: ICD-10-PCS | Mod: S$PBB,,, | Performed by: NURSE PRACTITIONER

## 2023-11-29 PROCEDURE — 3078F DIAST BP <80 MM HG: CPT | Mod: CPTII,,, | Performed by: NURSE PRACTITIONER

## 2023-11-29 PROCEDURE — 1160F PR REVIEW ALL MEDS BY PRESCRIBER/CLIN PHARMACIST DOCUMENTED: ICD-10-PCS | Mod: CPTII,,, | Performed by: NURSE PRACTITIONER

## 2023-11-29 PROCEDURE — 3008F PR BODY MASS INDEX (BMI) DOCUMENTED: ICD-10-PCS | Mod: CPTII,,, | Performed by: NURSE PRACTITIONER

## 2023-11-29 PROCEDURE — 99213 OFFICE O/P EST LOW 20 MIN: CPT | Mod: PBBFAC | Performed by: NURSE PRACTITIONER

## 2023-11-29 PROCEDURE — 3074F PR MOST RECENT SYSTOLIC BLOOD PRESSURE < 130 MM HG: ICD-10-PCS | Mod: CPTII,,, | Performed by: NURSE PRACTITIONER

## 2023-11-29 PROCEDURE — 3078F PR MOST RECENT DIASTOLIC BLOOD PRESSURE < 80 MM HG: ICD-10-PCS | Mod: CPTII,,, | Performed by: NURSE PRACTITIONER

## 2023-11-29 PROCEDURE — 1159F PR MEDICATION LIST DOCUMENTED IN MEDICAL RECORD: ICD-10-PCS | Mod: CPTII,,, | Performed by: NURSE PRACTITIONER

## 2023-11-29 PROCEDURE — 3074F SYST BP LT 130 MM HG: CPT | Mod: CPTII,,, | Performed by: NURSE PRACTITIONER

## 2023-11-29 PROCEDURE — 99999 PR PBB SHADOW E&M-EST. PATIENT-LVL III: CPT | Mod: PBBFAC,,, | Performed by: NURSE PRACTITIONER

## 2023-11-29 PROCEDURE — 99212 OFFICE O/P EST SF 10 MIN: CPT | Mod: S$PBB,,, | Performed by: NURSE PRACTITIONER

## 2023-11-29 PROCEDURE — 1159F MED LIST DOCD IN RCRD: CPT | Mod: CPTII,,, | Performed by: NURSE PRACTITIONER

## 2023-11-29 PROCEDURE — 99999 PR PBB SHADOW E&M-EST. PATIENT-LVL III: ICD-10-PCS | Mod: PBBFAC,,, | Performed by: NURSE PRACTITIONER

## 2023-11-29 NOTE — PROGRESS NOTES
Subjective:       Patient ID: Ruslan Perez is a 23 y.o. female.    Chief Complaint:  Hormones     Patient's last menstrual period was 11/10/2023 (exact date).  History of Present Illness  Complains of weight gain, acne, mood swings, pelvic pain (due to ovarian cyst) , and always being hungry but never eats anything. She has PCOS however her cycle are monthly however they have started coming one week early   Referred to us by PCP for hormone check       OB History    Para Term  AB Living   1 1 1 0 0 1   SAB IAB Ectopic Multiple Live Births   0 0 0   1      # Outcome Date GA Lbr Marcel/2nd Weight Sex Delivery Anes PTL Lv   1 Term 17    M Vag-Spont EPI N JONATAN       Review of Systems  Review of Systems        Objective:    Physical Exam  Constitutional:       Appearance: Normal appearance.   Skin:     General: Skin is warm and dry.   Neurological:      Mental Status: She is alert.   Psychiatric:         Mood and Affect: Mood normal.         Behavior: Behavior normal.         Thought Content: Thought content normal.         Judgment: Judgment normal.           Assessment:     1. Weight gain    2. Acne, unspecified acne type    3. Mood swings    4. Cyst of ovary, unspecified laterality              Plan:   Ruslan was seen today for hormones .    Diagnoses and all orders for this visit:    Weight gain    Acne, unspecified acne type    Mood swings    Cyst of ovary, unspecified laterality    Weight gain and acne, recommend see PCP and derm   Acne recommend see dermatology   Mood swings states that it is more like PMS offered antidepressants, declines states that she has been on them before and they did not work   Ovarian cyst recommend hormonal birth control - declines does not like to take hormonal birth control because it makes her crazy

## 2023-12-17 ENCOUNTER — HOSPITAL ENCOUNTER (EMERGENCY)
Facility: HOSPITAL | Age: 23
Discharge: HOME | End: 2023-12-17
Attending: EMERGENCY MEDICINE
Payer: MEDICAID

## 2023-12-17 VITALS
DIASTOLIC BLOOD PRESSURE: 71 MMHG | TEMPERATURE: 99 F | BODY MASS INDEX: 24.94 KG/M2 | OXYGEN SATURATION: 98 % | WEIGHT: 123.44 LBS | RESPIRATION RATE: 24 BRPM | HEART RATE: 106 BPM | SYSTOLIC BLOOD PRESSURE: 107 MMHG

## 2023-12-17 DIAGNOSIS — J20.9 ACUTE BRONCHITIS, UNSPECIFIED ORGANISM: Primary | ICD-10-CM

## 2023-12-17 DIAGNOSIS — M54.2 NECK PAIN: ICD-10-CM

## 2023-12-17 DIAGNOSIS — R05.9 COUGH: ICD-10-CM

## 2023-12-17 PROCEDURE — 99284 EMERGENCY DEPT VISIT MOD MDM: CPT | Mod: 25

## 2023-12-17 PROCEDURE — 25000003 PHARM REV CODE 250: Performed by: REGISTERED NURSE

## 2023-12-17 RX ORDER — HYDROCODONE BITARTRATE AND ACETAMINOPHEN 5; 325 MG/1; MG/1
1 TABLET ORAL EVERY 6 HOURS PRN
Qty: 12 TABLET | Refills: 0 | Status: SHIPPED | OUTPATIENT
Start: 2023-12-17

## 2023-12-17 RX ORDER — ALBUTEROL SULFATE 90 UG/1
1-2 AEROSOL, METERED RESPIRATORY (INHALATION) EVERY 6 HOURS PRN
Qty: 18 G | Refills: 0 | OUTPATIENT
Start: 2023-12-17 | End: 2024-03-02

## 2023-12-17 RX ORDER — CYCLOBENZAPRINE HCL 5 MG
10 TABLET ORAL
Status: COMPLETED | OUTPATIENT
Start: 2023-12-17 | End: 2023-12-17

## 2023-12-17 RX ORDER — HYDROCODONE BITARTRATE AND ACETAMINOPHEN 5; 325 MG/1; MG/1
1 TABLET ORAL
Status: COMPLETED | OUTPATIENT
Start: 2023-12-17 | End: 2023-12-17

## 2023-12-17 RX ORDER — BENZONATATE 100 MG/1
100 CAPSULE ORAL 3 TIMES DAILY PRN
Qty: 20 CAPSULE | Refills: 0 | Status: SHIPPED | OUTPATIENT
Start: 2023-12-17 | End: 2023-12-27

## 2023-12-17 RX ORDER — DICLOFENAC SODIUM 50 MG/1
50 TABLET, DELAYED RELEASE ORAL 2 TIMES DAILY
Qty: 20 TABLET | Refills: 0 | Status: SHIPPED | OUTPATIENT
Start: 2023-12-17

## 2023-12-17 RX ORDER — CYCLOBENZAPRINE HCL 10 MG
10 TABLET ORAL 3 TIMES DAILY PRN
Qty: 15 TABLET | Refills: 0 | Status: SHIPPED | OUTPATIENT
Start: 2023-12-17 | End: 2023-12-22

## 2023-12-17 RX ADMIN — CYCLOBENZAPRINE HYDROCHLORIDE 10 MG: 5 TABLET, FILM COATED ORAL at 02:12

## 2023-12-17 RX ADMIN — HYDROCODONE BITARTRATE AND ACETAMINOPHEN 1 TABLET: 5; 325 TABLET ORAL at 02:12

## 2023-12-17 NOTE — ED PROVIDER NOTES
Encounter Date: 12/17/2023       History     Chief Complaint   Patient presents with    Back Pain     Upper Back pain that radiates to both arms. Pt also has a persistent cough.      23-year-old female presents emergency department with complaints of upper back and neck pain and cough.  Patient reports cough for the last 2 weeks.  Patient denies any fever, chills, shortness of breath or any other symptoms.    The history is provided by the patient.     Review of patient's allergies indicates:   Allergen Reactions    Fluconazole Anaphylaxis and Hives    Latex Itching and Other (See Comments)     Redness     Past Medical History:   Diagnosis Date    ADHD (attention deficit hyperactivity disorder)     Anxiety     Anxiety state 11/14/2016 8:45:01 AM    [facility] - [facility]: Anxiety-No Additional Notes    Anxiety state 11/14/2016 8:45:01 AM    [facility] - [facility]: Anxiety-No Additional Notes    Calculus of kidney 8/20/2018 11:46:04 AM    [facility] - Unknown: Kidney stones, calcium oxalate-No Additional Notes    Calculus of kidney 8/20/2018 11:46:04 AM    [facility] - Unknown: Kidney stones, calcium oxalate-No Additional Notes    Depression     History of chlamydia     Infection of kidney 8/20/2018 11:46:21 AM    [facility] - [facility]: Kidney Infection-No Additional Notes    Infection of kidney 8/20/2018 11:46:21 AM    [facility] - [facility]: Kidney Infection-No Additional Notes    Lactose intolerance     Migraine headache     Other and unspecified ovarian cyst 6/27/2016 12:55:46 PM    [facility] - [facility]: Ovarian Cyst-No Additional Notes    Other and unspecified ovarian cyst 6/27/2016 12:55:46 PM    [facility] - [facility]: Ovarian Cyst-No Additional Notes    Other depressive disorder 11/14/2016 8:45:02 AM    [facility] - [facility]: Depression-No Additional Notes    Other depressive disorder 11/14/2016 8:45:02 AM    [facility]  Historical - [facility]: Depression-No Additional Notes    Renal disorder     kidney stones    TIA (transient ischemic attack)     Unspecified chlamydial infection, in conditions classified elsewhere and of unspecified site 9/28/2017 3:59:08 PM    [facility] - [facility]: Chlamydia-No Additional Notes    Unspecified chlamydial infection, in conditions classified elsewhere and of unspecified site 9/28/2017 3:59:08 PM    [facility] - [facility]: Chlamydia-No Additional Notes    Urinary tract infection 8/20/2018 11:46:40 AM    [facility] - Urology: Urinary Tract Infection-No Additional Notes    Urinary tract infection 8/20/2018 11:46:40 AM    [facility] - Urology: Urinary Tract Infection-No Additional Notes     Past Surgical History:   Procedure Laterality Date    blood vessel removed from chest      BREAST SURGERY  03/22/2019    kidney stent      Mirena      placed 2 years ago     Family History   Problem Relation Age of Onset    Breast cancer Paternal Grandmother     Irritable bowel syndrome Mother     Breast cancer Paternal Aunt     Colon cancer Neg Hx     Ovarian cancer Neg Hx      Social History     Tobacco Use    Smoking status: Some Days     Current packs/day: 0.50     Types: Vaping with nicotine, Cigarettes    Smokeless tobacco: Current   Substance Use Topics    Alcohol use: No    Drug use: No     Review of Systems   Constitutional:  Negative for fever.   HENT:  Negative for sore throat.    Respiratory:  Positive for cough. Negative for shortness of breath.    Cardiovascular:  Negative for chest pain.   Gastrointestinal:  Negative for nausea.   Genitourinary:  Negative for dysuria.   Musculoskeletal:  Positive for myalgias and neck pain. Negative for back pain.   Skin:  Negative for rash.   Neurological:  Negative for weakness.   Hematological:  Does not bruise/bleed easily.   All other systems reviewed and are negative.      Physical Exam     Initial Vitals [12/17/23 1324]    BP Pulse Resp Temp SpO2   107/71 106 18 99 °F (37.2 °C) 98 %      MAP       --         Physical Exam    Constitutional: She appears well-developed and well-nourished. She is not diaphoretic. No distress.   HENT:   Head: Normocephalic and atraumatic.   Eyes: Conjunctivae and EOM are normal. Pupils are equal, round, and reactive to light.   Neck: Neck supple.   Normal range of motion.  Cardiovascular:  Normal rate, regular rhythm and normal heart sounds.           No murmur heard.  Pulmonary/Chest: Breath sounds normal. No respiratory distress. She has no wheezes. She has no rales.   Abdominal: Abdomen is soft. Bowel sounds are normal. There is no abdominal tenderness. There is no rebound and no guarding.   Musculoskeletal:         General: No edema. Normal range of motion.      Cervical back: Normal range of motion and neck supple. Tenderness present. No bony tenderness.      Thoracic back: Normal.      Lumbar back: Normal.        Back:      Neurological: She is alert and oriented to person, place, and time. No cranial nerve deficit. GCS score is 15. GCS eye subscore is 4. GCS verbal subscore is 5. GCS motor subscore is 6.   Skin: Skin is warm and dry. Capillary refill takes less than 2 seconds.   Psychiatric: She has a normal mood and affect. Thought content normal.         ED Course   Procedures  Labs Reviewed - No data to display       Imaging Results              X-Ray Cervical Spine AP And Lateral (Final result)  Result time 12/17/23 14:07:08      Final result by Raphael Cunha MD (12/17/23 14:07:08)                   Impression:      Normal cervical spine x-ray.      Electronically signed by: Raphael Cunha MD  Date:    12/17/2023  Time:    14:07               Narrative:    EXAMINATION:  XR CERVICAL SPINE AP LATERAL    CLINICAL HISTORY:  Cervicalgia    TECHNIQUE:  AP, lateral and open mouth views of the cervical spine were performed.    COMPARISON:  None.    FINDINGS:  There is no fracture or malalignment.   No degenerative sequela.                                       X-Ray Chest 1 View (Final result)  Result time 12/17/23 14:03:35      Final result by Manuel Reeves MD (12/17/23 14:03:35)                   Impression:      No acute abnormality.      Electronically signed by: Manuel Reeves  Date:    12/17/2023  Time:    14:03               Narrative:    EXAMINATION:  XR CHEST 1 VIEW    CLINICAL HISTORY:  Cough, unspecified    TECHNIQUE:  Single frontal view of the chest was performed.    COMPARISON:  Multiple P    FINDINGS:  The lungs are clear, with normal appearance of pulmonary vasculature and no pleural effusion or pneumothorax.    The cardiac silhouette is normal in size. The hilar and mediastinal contours are unremarkable.    Bones are intact.                                       Medications   HYDROcodone-acetaminophen 5-325 mg per tablet 1 tablet (1 tablet Oral Given 12/17/23 1414)   cyclobenzaprine tablet 10 mg (10 mg Oral Given 12/17/23 1414)     Medical Decision Making  Amount and/or Complexity of Data Reviewed  Radiology: ordered.     Details: No acute findings    Risk  Prescription drug management.  Risk Details: Treat medications.  Follow up with primary care.                                      Clinical Impression:  Final diagnoses:  [R05.9] Cough  [M54.2] Neck pain  [J20.9] Acute bronchitis, unspecified organism (Primary)          ED Disposition Condition    Discharge Stable          ED Prescriptions       Medication Sig Dispense Start Date End Date Auth. Provider    benzonatate (TESSALON) 100 MG capsule Take 1 capsule (100 mg total) by mouth 3 (three) times daily as needed for Cough. 20 capsule 12/17/2023 12/27/2023 Anthony Stovall Jr., FNP    HYDROcodone-acetaminophen (NORCO) 5-325 mg per tablet Take 1 tablet by mouth every 6 (six) hours as needed for Pain. 12 tablet 12/17/2023 -- Anthony Stovall Jr., KHURRAMP    cyclobenzaprine (FLEXERIL) 10 MG tablet Take 1 tablet (10 mg total) by mouth 3  (three) times daily as needed for Muscle spasms. 15 tablet 12/17/2023 12/22/2023 Anthony Stovall Jr., FNP    diclofenac (VOLTAREN) 50 MG EC tablet Take 1 tablet (50 mg total) by mouth 2 (two) times daily. 20 tablet 12/17/2023 -- Anthony Stovall Jr., FNP    albuterol (PROVENTIL/VENTOLIN HFA) 90 mcg/actuation inhaler Inhale 1-2 puffs into the lungs every 6 (six) hours as needed for Wheezing. Rescue 18 g 12/17/2023 12/16/2024 Anthony Stovall Jr., FNP          Follow-up Information       Follow up With Specialties Details Why Contact Info    [facility] - Emergency Dept. Emergency Medicine  If symptoms worsen [address]  [facility] [address] [phone]             Anthony Stovall Jr., FNP  12/17/23 1204

## 2024-01-24 ENCOUNTER — TELEPHONE (OUTPATIENT)
Dept: OBSTETRICS AND GYNECOLOGY | Facility: CLINIC | Age: 24
End: 2024-01-24
Payer: MEDICAID

## 2024-01-24 NOTE — TELEPHONE ENCOUNTER
----- Message from Nila Munguia sent at 1/24/2024  8:17 AM CST -----  Contact: [phone]  Type:  Sooner Apoointment Request    Caller is requesting a sooner appointment.  Caller declined first available appointment listed below.  Caller will not accept being placed on the waitlist and is requesting a message be sent to doctor.  Name of Caller:Ruslan   When is the first available appointment?02/16/2024  Symptoms:WWE/STD screening   Would the patient rather a call back or a response via [facility]? Call back   Best Call Back Number:[phone]   Additional Information:

## 2024-03-02 ENCOUNTER — HOSPITAL ENCOUNTER (EMERGENCY)
Facility: HOSPITAL | Age: 24
Discharge: HOME | End: 2024-03-02
Attending: EMERGENCY MEDICINE
Payer: MEDICAID

## 2024-03-02 VITALS
SYSTOLIC BLOOD PRESSURE: 122 MMHG | HEART RATE: 90 BPM | TEMPERATURE: 98 F | RESPIRATION RATE: 16 BRPM | OXYGEN SATURATION: 98 % | BODY MASS INDEX: 24.42 KG/M2 | DIASTOLIC BLOOD PRESSURE: 78 MMHG | WEIGHT: 121.13 LBS | HEIGHT: 59 IN

## 2024-03-02 DIAGNOSIS — J20.9 ACUTE BRONCHITIS, UNSPECIFIED ORGANISM: Primary | ICD-10-CM

## 2024-03-02 DIAGNOSIS — R05.9 COUGH: ICD-10-CM

## 2024-03-02 PROCEDURE — 99284 EMERGENCY DEPT VISIT MOD MDM: CPT | Mod: 25

## 2024-03-02 RX ORDER — AZITHROMYCIN 250 MG/1
250 TABLET, FILM COATED ORAL DAILY
Qty: 6 TABLET | Refills: 0 | Status: SHIPPED | OUTPATIENT
Start: 2024-03-02

## 2024-03-02 RX ORDER — PROMETHAZINE HYDROCHLORIDE AND DEXTROMETHORPHAN HYDROBROMIDE 6.25; 15 MG/5ML; MG/5ML
5 SYRUP ORAL NIGHTLY PRN
Qty: 118 ML | Refills: 0 | Status: SHIPPED | OUTPATIENT
Start: 2024-03-02 | End: 2024-03-12

## 2024-03-02 RX ORDER — PREDNISONE 50 MG/1
50 TABLET ORAL DAILY
Qty: 5 TABLET | Refills: 0 | Status: SHIPPED | OUTPATIENT
Start: 2024-03-02 | End: 2024-03-02 | Stop reason: CLARIF

## 2024-03-02 RX ORDER — ALBUTEROL SULFATE 90 UG/1
2 AEROSOL, METERED RESPIRATORY (INHALATION) EVERY 6 HOURS PRN
Qty: 18 G | Refills: 0 | Status: SHIPPED | OUTPATIENT
Start: 2024-03-02 | End: 2024-04-01

## 2024-03-02 RX ORDER — ALBUTEROL SULFATE 90 UG/1
2 AEROSOL, METERED RESPIRATORY (INHALATION) EVERY 6 HOURS PRN
Qty: 18 G | Refills: 0 | Status: SHIPPED | OUTPATIENT
Start: 2024-03-02 | End: 2024-03-02

## 2024-03-02 NOTE — ED PROVIDER NOTES
Encounter Date: 3/2/2024       History     Chief Complaint   Patient presents with    Cough     Had flu in December, ever since she has had a cough - feels like a tickle in the back of throat, feels misbah she has a rattle in right lung and wants her lungs checked.     23-year-old female with complaint of cough and congestion over the past 2 months.  Patient reports she had the flu in December and has had cough ever since.  No fever or chills.  No shortness of breath.        Review of patient's allergies indicates:   Allergen Reactions    Fluconazole Anaphylaxis and Hives    Latex Itching and Other (See Comments)     Redness     Past Medical History:   Diagnosis Date    ADHD (attention deficit hyperactivity disorder)     Anxiety     Anxiety state 11/14/2016 8:45:01 AM    [facility] Historical - [facility]: Anxiety-No Additional Notes    Anxiety state 11/14/2016 8:45:01 AM    [facility] - [facility]: Anxiety-No Additional Notes    Calculus of kidney 8/20/2018 11:46:04 AM    [facility] - Unknown: Kidney stones, calcium oxalate-No Additional Notes    Calculus of kidney 8/20/2018 11:46:04 AM    [facility] - Unknown: Kidney stones, calcium oxalate-No Additional Notes    Depression     History of chlamydia     Infection of kidney 8/20/2018 11:46:21 AM    [facility] - [facility]: Kidney Infection-No Additional Notes    Infection of kidney 8/20/2018 11:46:21 AM    [facility] - [facility]: Kidney Infection-No Additional Notes    Lactose intolerance     Migraine headache     Other and unspecified ovarian cyst 6/27/2016 12:55:46 PM    [facility] - [facility]: Ovarian Cyst-No Additional Notes    Other and unspecified ovarian cyst 6/27/2016 12:55:46 PM    [facility] - [facility]: Ovarian Cyst-No Additional Notes    Other depressive disorder 11/14/2016 8:45:02 AM    [facility] - [facility]: Depression-No Additional Notes    Other depressive disorder 11/14/2016 8:45:02  AM    [facility] - [facility]: Depression-No Additional Notes    Renal disorder     kidney stones    TIA (transient ischemic attack)     Unspecified chlamydial infection, in conditions classified elsewhere and of unspecified site 9/28/2017 3:59:08 PM    [facility] - [facility]: Chlamydia-No Additional Notes    Unspecified chlamydial infection, in conditions classified elsewhere and of unspecified site 9/28/2017 3:59:08 PM    [facility] - [facility]: Chlamydia-No Additional Notes    Urinary tract infection 8/20/2018 11:46:40 AM    [facility] - Urology: Urinary Tract Infection-No Additional Notes    Urinary tract infection 8/20/2018 11:46:40 AM    [facility] - Urology: Urinary Tract Infection-No Additional Notes     Past Surgical History:   Procedure Laterality Date    blood vessel removed from chest      BREAST SURGERY  03/22/2019    kidney stent      Mirena      placed 2 years ago     Family History   Problem Relation Age of Onset    Breast cancer Paternal Grandmother     Irritable bowel syndrome Mother     Breast cancer Paternal Aunt     Colon cancer Neg Hx     Ovarian cancer Neg Hx      Social History     Tobacco Use    Smoking status: Some Days     Current packs/day: 0.50     Types: Vaping with nicotine, Cigarettes    Smokeless tobacco: Current   Substance Use Topics    Alcohol use: No    Drug use: No     Review of Systems   Constitutional:  Negative for fever.   HENT:  Positive for congestion. Negative for sore throat.    Respiratory:  Positive for cough. Negative for shortness of breath.    Cardiovascular:  Negative for chest pain.   Gastrointestinal:  Negative for nausea.   Genitourinary:  Negative for dysuria.   Musculoskeletal:  Negative for back pain.   Skin:  Negative for rash.   Neurological:  Negative for weakness.   Hematological:  Does not bruise/bleed easily.       Physical Exam     Initial Vitals [03/02/24 1521]   BP Pulse Resp Temp SpO2   122/78 90 16  97.8 °F (36.6 °C) 98 %      MAP       --         Physical Exam    Nursing note and vitals reviewed.  Constitutional: She appears well-developed and well-nourished.   HENT:   Head: Normocephalic and atraumatic.   Eyes: Conjunctivae and EOM are normal. Pupils are equal, round, and reactive to light.   Neck: Neck supple.   Normal range of motion.  Cardiovascular:  Normal rate, regular rhythm, normal heart sounds and intact distal pulses.           Pulmonary/Chest: Breath sounds normal.   Abdominal: Abdomen is soft. There is no abdominal tenderness. There is no rebound and no guarding.   Musculoskeletal:         General: Normal range of motion.      Cervical back: Normal range of motion and neck supple.     Neurological: She is alert and oriented to person, place, and time. She has normal strength and normal reflexes.   Skin: Skin is warm and dry.   Psychiatric: She has a normal mood and affect. Her behavior is normal. Thought content normal.         ED Course   Procedures  Labs Reviewed - No data to display       Imaging Results              X-Ray Chest 1 View (Final result)  Result time 03/02/24 15:53:48      Final result by Cecil Rosado MD (03/02/24 15:53:48)                   Impression:      No acute findings.      Electronically signed by: Cecil Rosado MD  Date:    03/02/2024  Time:    15:53               Narrative:    EXAMINATION:  XR CHEST 1 VIEW    CLINICAL HISTORY:  Cough and congestion,    COMPARISON:  12/17/2023 x-ray    FINDINGS:  Heart size is normal. The lung fields are clear. No acute cardiopulmonary infiltrate.                                    X-Rays:   Independently Interpreted Readings:   Other Readings:  CXR: neg    Medications - No data to display  Medical Decision Making  Amount and/or Complexity of Data Reviewed  Radiology: ordered.    Risk  Prescription drug management.                                      Clinical Impression:  Final diagnoses:  [R05.9] Cough  [J20.9] Acute  bronchitis, unspecified organism (Primary)          ED Disposition Condition    Discharge Stable          ED Prescriptions       Medication Sig Dispense Start Date End Date Auth. Provider    predniSONE (DELTASONE) 50 MG Tab Take 1 tablet (50 mg total) by mouth once daily. for 5 days 5 tablet 3/2/2024 3/7/2024 Lc Abbott NP    albuterol (VENTOLIN HFA) 90 mcg/actuation inhaler Inhale 2 puffs into the lungs every 6 (six) hours as needed for Wheezing. Rescue 18 g 3/2/2024 4/1/2024 Lc Abbott NP    promethazine-dextromethorphan (PROMETHAZINE-DM) 6.25-15 mg/5 mL Syrp Take 5 mLs by mouth nightly as needed (cough). 118 mL 3/2/2024 3/12/2024 Lc Abbott NP          Follow-up Information       Follow up With Specialties Details Why Contact Info    PCP  Schedule an appointment as soon as possible for a visit in 2 days               Lc Abbott NP  03/02/24 8967

## 2024-06-09 ENCOUNTER — HOSPITAL ENCOUNTER (EMERGENCY)
Facility: HOSPITAL | Age: 24
Discharge: HOME | End: 2024-06-09
Attending: EMERGENCY MEDICINE
Payer: MEDICAID

## 2024-06-09 VITALS
OXYGEN SATURATION: 100 % | WEIGHT: 125 LBS | DIASTOLIC BLOOD PRESSURE: 85 MMHG | HEART RATE: 110 BPM | RESPIRATION RATE: 20 BRPM | TEMPERATURE: 99 F | BODY MASS INDEX: 23.6 KG/M2 | HEIGHT: 61 IN | SYSTOLIC BLOOD PRESSURE: 132 MMHG

## 2024-06-09 DIAGNOSIS — E86.0 DEHYDRATION: Primary | ICD-10-CM

## 2024-06-09 PROCEDURE — 96374 THER/PROPH/DIAG INJ IV PUSH: CPT

## 2024-06-09 PROCEDURE — 25000003 PHARM REV CODE 250: Performed by: REGISTERED NURSE

## 2024-06-09 PROCEDURE — 63600175 PHARM REV CODE 636 W HCPCS: Performed by: REGISTERED NURSE

## 2024-06-09 PROCEDURE — 99284 EMERGENCY DEPT VISIT MOD MDM: CPT | Mod: 25

## 2024-06-09 PROCEDURE — 96375 TX/PRO/DX INJ NEW DRUG ADDON: CPT

## 2024-06-09 PROCEDURE — 96361 HYDRATE IV INFUSION ADD-ON: CPT

## 2024-06-09 RX ORDER — LORAZEPAM 2 MG/ML
1 INJECTION INTRAMUSCULAR
Status: COMPLETED | OUTPATIENT
Start: 2024-06-09 | End: 2024-06-09

## 2024-06-09 RX ORDER — ALPRAZOLAM 0.5 MG/1
TABLET ORAL
COMMUNITY

## 2024-06-09 RX ORDER — OXYCODONE AND ACETAMINOPHEN 10; 325 MG/1; MG/1
1 TABLET ORAL 3 TIMES DAILY
COMMUNITY
Start: 2024-04-08

## 2024-06-09 RX ORDER — PRAZOSIN HYDROCHLORIDE 1 MG/1
CAPSULE ORAL
COMMUNITY

## 2024-06-09 RX ORDER — ZONISAMIDE 100 MG/1
1 CAPSULE ORAL NIGHTLY
COMMUNITY

## 2024-06-09 RX ORDER — OXYCODONE AND ACETAMINOPHEN 5; 325 MG/1; MG/1
1 TABLET ORAL EVERY 8 HOURS
COMMUNITY
Start: 2024-04-16

## 2024-06-09 RX ORDER — CLONAZEPAM 1 MG/1
2 TABLET ORAL
Status: COMPLETED | OUTPATIENT
Start: 2024-06-09 | End: 2024-06-09

## 2024-06-09 RX ORDER — PROMETHAZINE HYDROCHLORIDE 25 MG/1
25 TABLET ORAL EVERY 8 HOURS PRN
COMMUNITY
Start: 2024-04-15

## 2024-06-09 RX ORDER — PANTOPRAZOLE SODIUM 40 MG/1
1 TABLET, DELAYED RELEASE ORAL EVERY MORNING
COMMUNITY
Start: 2024-01-12 | End: 2025-01-11

## 2024-06-09 RX ORDER — CYCLOBENZAPRINE HCL 10 MG
10 TABLET ORAL 3 TIMES DAILY
COMMUNITY
Start: 2024-04-08

## 2024-06-09 RX ORDER — TOBRAMYCIN 3 MG/ML
SOLUTION/ DROPS OPHTHALMIC
COMMUNITY

## 2024-06-09 RX ORDER — BUSPIRONE HYDROCHLORIDE 10 MG/1
TABLET ORAL
COMMUNITY

## 2024-06-09 RX ORDER — CLOBETASOL PROPIONATE 0.5 MG/G
CREAM TOPICAL 2 TIMES DAILY
COMMUNITY
Start: 2024-04-30

## 2024-06-09 RX ORDER — CETIRIZINE HYDROCHLORIDE 10 MG/1
10 TABLET ORAL EVERY MORNING
COMMUNITY
Start: 2024-03-20

## 2024-06-09 RX ORDER — ONDANSETRON 4 MG/1
4 TABLET, ORALLY DISINTEGRATING ORAL EVERY 6 HOURS PRN
Qty: 12 TABLET | Refills: 0 | Status: SHIPPED | OUTPATIENT
Start: 2024-06-09

## 2024-06-09 RX ORDER — ONDANSETRON HYDROCHLORIDE 2 MG/ML
4 INJECTION, SOLUTION INTRAVENOUS
Status: COMPLETED | OUTPATIENT
Start: 2024-06-09 | End: 2024-06-09

## 2024-06-09 RX ADMIN — CLONAZEPAM 2 MG: 1 TABLET ORAL at 11:06

## 2024-06-09 RX ADMIN — LORAZEPAM 1 MG: 2 INJECTION INTRAMUSCULAR; INTRAVENOUS at 11:06

## 2024-06-09 RX ADMIN — SODIUM CHLORIDE 1000 ML: 9 INJECTION, SOLUTION INTRAVENOUS at 10:06

## 2024-06-09 RX ADMIN — ONDANSETRON 4 MG: 2 INJECTION INTRAMUSCULAR; INTRAVENOUS at 10:06

## 2024-06-10 NOTE — FIRST PROVIDER EVALUATION
Medical screening examination initiated.  I have conducted a focused provider triage encounter, findings are as follows:    Brief history of present illness:   patient reports being intoxicated[place]  Feels dehydrated with nausea.    There were no vitals filed for this visit.    Pertinent physical exam:    No acute distress, patient alert and oriented    Brief workup plan:   labs and fluids    Preliminary workup initiated; this workup will be continued and followed by the physician or advanced practice provider that is assigned to the patient when roomed.

## 2024-06-10 NOTE — ED PROVIDER NOTES
Encounter Date: 6/9/2024       History     Chief Complaint   Patient presents with    Dehydration     Pt c/o dehydration after being in the sun all day       23-year-old female presents emergency department complaints of dehydration and fatigue.  Patient reports drinking alcohol in the river all day today.  Patient denies any chest pain or shortness of breath at this time.    The history is provided by the patient.     Review of patient's allergies indicates:   Allergen Reactions    Fluconazole Anaphylaxis and Hives    Latex Itching and Other (See Comments)     Redness     Past Medical History:   Diagnosis Date    ADHD (attention deficit hyperactivity disorder)     Anxiety     Anxiety state 11/14/2016 8:45:01 AM    [facility] - [facility]: Anxiety-No Additional Notes    Anxiety state 11/14/2016 8:45:01 AM    [facility] - [facility]: Anxiety-No Additional Notes    Calculus of kidney 8/20/2018 11:46:04 AM    [facility] - Unknown: Kidney stones, calcium oxalate-No Additional Notes    Calculus of kidney 8/20/2018 11:46:04 AM    [facility] - Unknown: Kidney stones, calcium oxalate-No Additional Notes    Depression     History of chlamydia     Infection of kidney 8/20/2018 11:46:21 AM    [facility] - [facility]: Kidney Infection-No Additional Notes    Infection of kidney 8/20/2018 11:46:21 AM    [facility]: Kidney Infection-No Additional Notes    Lactose intolerance     Migraine headache     Other and unspecified ovarian cyst 6/27/2016 12:55:46 PM    [facility] - [facility]: Ovarian Cyst-No Additional Notes    Other and unspecified ovarian cyst 6/27/2016 12:55:46 PM    [facility] - [facility]: Ovarian Cyst-No Additional Notes    Other depressive disorder 11/14/2016 8:45:02 AM    [facility] - [facility]: Depression-No Additional Notes    Other depressive disorder 11/14/2016 8:45:02 AM    [facility]: Depression-No  Additional Notes    Renal disorder     kidney stones    TIA (transient ischemic attack)     Unspecified chlamydial infection, in conditions classified elsewhere and of unspecified site 9/28/2017 3:59:08 PM    [facility] - [facility]: Chlamydia-No Additional Notes    Unspecified chlamydial infection, in conditions classified elsewhere and of unspecified site 9/28/2017 3:59:08 PM    [facility] - [facility]: Chlamydia-No Additional Notes    Urinary tract infection 8/20/2018 11:46:40 AM    [facility] - Urology: Urinary Tract Infection-No Additional Notes    Urinary tract infection 8/20/2018 11:46:40 AM    [facility] - Urology: Urinary Tract Infection-No Additional Notes     Past Surgical History:   Procedure Laterality Date    blood vessel removed from chest      BREAST SURGERY  03/22/2019    kidney stent      Mirena      placed 2 years ago     Family History   Problem Relation Name Age of Onset    Breast cancer Paternal Grandmother      Irritable bowel syndrome Mother      Breast cancer Paternal Aunt x2     Colon cancer Neg Hx      Ovarian cancer Neg Hx       Social History     Tobacco Use    Smoking status: Some Days     Current packs/day: 0.50     Types: Vaping with nicotine, Cigarettes    Smokeless tobacco: Current   Substance Use Topics    Alcohol use: No    Drug use: No     Review of Systems   Constitutional:  Positive for fatigue. Negative for fever.   HENT:  Negative for sore throat.    Respiratory:  Negative for shortness of breath.    Cardiovascular:  Negative for chest pain.   Gastrointestinal:  Positive for nausea.   Genitourinary:  Negative for dysuria.   Musculoskeletal:  Negative for back pain.   Skin:  Negative for rash.   Neurological:  Positive for weakness.   Hematological:  Does not bruise/bleed easily.   Psychiatric/Behavioral:  The patient is nervous/anxious.    All other systems reviewed and are negative.      Physical Exam     Initial Vitals [06/09/24 2002]    BP Pulse Resp Temp SpO2   132/85 110 20 98.7 °F (37.1 °C) 100 %      MAP       --         Physical Exam    Constitutional: She appears well-developed and well-nourished. She is not diaphoretic. No distress.   HENT:   Head: Normocephalic and atraumatic.   Eyes: Conjunctivae and EOM are normal. Pupils are equal, round, and reactive to light.   Neck: Neck supple.   Normal range of motion.  Cardiovascular:  Normal rate, regular rhythm and normal heart sounds.           No murmur heard.  Pulmonary/Chest: Breath sounds normal. No respiratory distress. She has no wheezes. She has no rales.   Abdominal: Abdomen is soft. Bowel sounds are normal. There is no abdominal tenderness. There is no rebound and no guarding.   Musculoskeletal:         General: No tenderness or edema. Normal range of motion.      Cervical back: Normal range of motion and neck supple.     Neurological: She is alert and oriented to person, place, and time. No cranial nerve deficit. GCS score is 15. GCS eye subscore is 4. GCS verbal subscore is 5. GCS motor subscore is 6.   Skin: Skin is warm and dry. Capillary refill takes less than 2 seconds.   Psychiatric: Thought content normal. Her mood appears anxious.         ED Course   Procedures  Labs Reviewed - No data to display       Imaging Results    None          Medications   sodium chloride 0.9% bolus 1,000 mL 1,000 mL (0 mLs Intravenous Stopped 6/9/24 2314)   ondansetron injection 4 mg (4 mg Intravenous Given 6/9/24 2217)   LORazepam injection 1 mg (1 mg Intravenous Given 6/9/24 2302)   clonazePAM tablet 2 mg (2 mg Oral Given 6/9/24 2335)     Medical Decision Making  Amount and/or Complexity of Data Reviewed  Discussion of management or test interpretation with external provider(s):   Blood draws the attempted multiple times.  Patient declined any further sticks for blood.  Fluids infusing with no difficulty.    Risk  Prescription drug management.  Risk Details: I discussed with patient and/or  family/caretaker that evaluation in the ED does not suggest any emergent or life threatening medical conditions requiring immediate intervention beyond what was provided in the ED, and I believe patient is safe for discharge.  Regardless, an unremarkable evaluation in the ED does not preclude the development or presence of a serious of life threatening condition. As such, patient was instructed to return immediately for any worsening or change in current symptoms.                                        Clinical Impression:  Final diagnoses:  [E86.0] Dehydration (Primary)          ED Disposition Condition    Discharge Stable          ED Prescriptions       Medication Sig Dispense Start Date End Date Auth. Provider    ondansetron (ZOFRAN-ODT) 4 MG TbDL Take 1 tablet (4 mg total) by mouth every 6 (six) hours as needed (vomiting). 12 tablet 6/9/2024 -- Anthony Stovall Jr., FNP          Follow-up Information       Follow up With Specialties Details Why Contact Info    [facility] - Emergency Dept. Emergency Medicine  If symptoms worsen [address] [address] [phone]             Anthony Stovall Jr., FNP  06/09/24 5685

## 2024-09-01 ENCOUNTER — HOSPITAL ENCOUNTER (EMERGENCY)
Facility: HOSPITAL | Age: 24
Discharge: HOME | End: 2024-09-01
Attending: EMERGENCY MEDICINE
Payer: MEDICAID

## 2024-09-01 VITALS
BODY MASS INDEX: 21.27 KG/M2 | RESPIRATION RATE: 18 BRPM | TEMPERATURE: 98 F | DIASTOLIC BLOOD PRESSURE: 65 MMHG | HEIGHT: 61 IN | SYSTOLIC BLOOD PRESSURE: 102 MMHG | HEART RATE: 90 BPM | WEIGHT: 112.63 LBS | OXYGEN SATURATION: 51 %

## 2024-09-01 DIAGNOSIS — S43.401A SPRAIN OF RIGHT SHOULDER, UNSPECIFIED SHOULDER SPRAIN TYPE, INITIAL ENCOUNTER: Primary | ICD-10-CM

## 2024-09-01 PROCEDURE — 63600175 PHARM REV CODE 636 W HCPCS: Performed by: NURSE PRACTITIONER

## 2024-09-01 PROCEDURE — 25000003 PHARM REV CODE 250: Performed by: NURSE PRACTITIONER

## 2024-09-01 PROCEDURE — 99284 EMERGENCY DEPT VISIT MOD MDM: CPT | Mod: 25

## 2024-09-01 PROCEDURE — 96372 THER/PROPH/DIAG INJ SC/IM: CPT | Performed by: NURSE PRACTITIONER

## 2024-09-01 RX ORDER — ONDANSETRON 4 MG/1
4 TABLET, ORALLY DISINTEGRATING ORAL
Status: COMPLETED | OUTPATIENT
Start: 2024-09-01 | End: 2024-09-01

## 2024-09-01 RX ORDER — DICLOFENAC SODIUM 75 MG/1
75 TABLET, DELAYED RELEASE ORAL 2 TIMES DAILY
Qty: 14 TABLET | Refills: 0 | Status: SHIPPED | OUTPATIENT
Start: 2024-09-01

## 2024-09-01 RX ORDER — HYDROCODONE BITARTRATE AND ACETAMINOPHEN 10; 325 MG/1; MG/1
1 TABLET ORAL EVERY 6 HOURS PRN
Qty: 12 TABLET | Refills: 0 | Status: SHIPPED | OUTPATIENT
Start: 2024-09-01

## 2024-09-01 RX ORDER — HYDROCODONE BITARTRATE AND ACETAMINOPHEN 7.5; 325 MG/1; MG/1
1 TABLET ORAL ONCE
Status: COMPLETED | OUTPATIENT
Start: 2024-09-01 | End: 2024-09-01

## 2024-09-01 RX ORDER — MORPHINE SULFATE 4 MG/ML
4 INJECTION, SOLUTION INTRAMUSCULAR; INTRAVENOUS
Status: COMPLETED | OUTPATIENT
Start: 2024-09-01 | End: 2024-09-01

## 2024-09-01 RX ADMIN — ONDANSETRON 4 MG: 4 TABLET, ORALLY DISINTEGRATING ORAL at 10:09

## 2024-09-01 RX ADMIN — ONDANSETRON 4 MG: 4 TABLET, ORALLY DISINTEGRATING ORAL at 07:09

## 2024-09-01 RX ADMIN — MORPHINE SULFATE 4 MG: 4 INJECTION INTRAVENOUS at 07:09

## 2024-09-01 RX ADMIN — HYDROCODONE BITARTRATE AND ACETAMINOPHEN 1 TABLET: 7.5; 325 TABLET ORAL at 10:09

## 2024-09-01 NOTE — Clinical Note
"Ruslan Sheppardcristian Perez was seen and treated in our emergency department on 9/1/2024.  She may return to work on 09/05/2024.       If you have any questions or concerns, please don't hesitate to call.      Jose Daniel Cutler NP"

## 2024-09-02 NOTE — ED PROVIDER NOTES
Encounter Date: 9/1/2024       History     Chief Complaint   Patient presents with    Shoulder Pain     Right shoulder pain and neck pain x 3 days, s/p pole dancing.     Patient complains of right shoulder pain states that has been going on for a couple days she works as a dancer and hangs off with poles which she believes exacerbated this.        Review of patient's allergies indicates:   Allergen Reactions    Fluconazole Anaphylaxis and Hives    Latex Itching and Other (See Comments)     Redness     Past Medical History:   Diagnosis Date    ADHD (attention deficit hyperactivity disorder)     Anxiety     Anxiety state 11/14/2016 8:45:01 AM    [facility]: Anxiety-No Additional Notes    Anxiety state 11/14/2016 8:45:01 AM    [facility] - [facility]: Anxiety-No Additional Notes    Calculus of kidney 8/20/2018 11:46:04 AM    [facility] - Unknown: Kidney stones, calcium oxalate-No Additional Notes    Calculus of kidney 8/20/2018 11:46:04 AM    [facility] - Unknown: Kidney stones, calcium oxalate-No Additional Notes    Depression     History of chlamydia     Infection of kidney 8/20/2018 11:46:21 AM    [facility]: Kidney Infection-No Additional Notes    Infection of kidney 8/20/2018 11:46:21 AM    [facility] [place]: Kidney Infection-No Additional Notes    Lactose intolerance     Migraine headache     Other and unspecified ovarian cyst 6/27/2016 12:55:46 PM    [facility]: Ovarian Cyst-No Additional Notes    Other and unspecified ovarian cyst 6/27/2016 12:55:46 PM    [facility]: Ovarian Cyst-No Additional Notes    Other depressive disorder 11/14/2016 8:45:02 AM    [facility]: Depression-No Additional Notes    Other depressive disorder 11/14/2016 8:45:02 AM    [facility]: Depression-No Additional Notes    Renal disorder     kidney stones    TIA (transient  ischemic attack)     Unspecified chlamydial infection, in conditions classified elsewhere and of unspecified site 9/28/2017 3:59:08 PM    [facility]: Chlamydia-No Additional Notes    Unspecified chlamydial infection, in conditions classified elsewhere and of unspecified site 9/28/2017 3:59:08 PM    [facility]: Chlamydia-No Additional Notes    Urinary tract infection 8/20/2018 11:46:40 AM    [facility] Urology: Urinary Tract Infection-No Additional Notes    Urinary tract infection 8/20/2018 11:46:40 AM    [facility] - Urology: Urinary Tract Infection-No Additional Notes     Past Surgical History:   Procedure Laterality Date    blood vessel removed from chest      BREAST SURGERY  03/22/2019    kidney stent      Mirena      placed 2 years ago     Family History   Problem Relation Name Age of Onset    Breast cancer Paternal Grandmother      Irritable bowel syndrome Mother      Breast cancer Paternal Aunt x2     Colon cancer Neg Hx      Ovarian cancer Neg Hx       Social History     Tobacco Use    Smoking status: Some Days     Current packs/day: 0.50     Types: Vaping with nicotine, Cigarettes    Smokeless tobacco: Current   Substance Use Topics    Alcohol use: No    Drug use: No     Review of Systems   Constitutional:  Negative for fever.   HENT:  Negative for sore throat.    Respiratory:  Negative for shortness of breath.    Cardiovascular:  Negative for chest pain.   Gastrointestinal:  Negative for nausea.   Genitourinary:  Negative for dysuria.   Musculoskeletal:  Negative for back pain.   Skin:  Negative for rash.   Neurological:  Negative for weakness.   Hematological:  Does not bruise/bleed easily.       Physical Exam     Initial Vitals [09/01/24 1917]   BP Pulse Resp Temp SpO2   102/65 90 18 98.1 °F (36.7 °C) (!) 51 %      MAP       --         Physical Exam    Nursing note and vitals reviewed.  Constitutional: She appears well-developed and  well-nourished. She is not diaphoretic. She is active.  Non-toxic appearance. No distress.   HENT:   Head: Normocephalic and atraumatic.   Eyes: Conjunctivae are normal. Right eye exhibits no discharge. Left eye exhibits no discharge. No scleral icterus.   Neck:   Normal range of motion.  Cardiovascular:  Normal rate, regular rhythm and intact distal pulses.           No murmur heard.  Pulmonary/Chest: Breath sounds normal. No respiratory distress. She has no wheezes.   Abdominal: She exhibits no distension.   Musculoskeletal:         General: No tenderness. Normal range of motion.      Cervical back: Normal range of motion.      Comments: Right shoulder tenderness with range of motion     Neurological: She is alert and oriented to person, place, and time. No cranial nerve deficit. GCS score is 15. GCS eye subscore is 4. GCS verbal subscore is 5. GCS motor subscore is 6.   Skin: Skin is warm and dry. Capillary refill takes less than 2 seconds. No rash noted.   Psychiatric: She has a normal mood and affect. Her behavior is normal. Judgment and thought content normal.         ED Course   Procedures  Labs Reviewed - No data to display       Imaging Results              X-ray Shoulder 2 or More Views Right (Final result)  Result time 09/01/24 20:57:34      Final result by Sheng Holloway MD (09/01/24 20:57:34)                   Impression:      No acute process identified      Electronically signed by: Sheng Holloway  Date:    09/01/2024  Time:    20:57               Narrative:    EXAMINATION:  XR SHOULDER COMPLETE 2 OR MORE VIEWS RIGHT    CLINICAL HISTORY:  shoulder strain;    TECHNIQUE:  Two or three views of the right shoulder were performed.    COMPARISON:  None    FINDINGS:  No acute fracture or dislocation.  Normal bone mineral density.  Questionable mild AC joint hypertrophy.                                       Medications   morphine injection 4 mg (4 mg Intramuscular Given 9/1/24 1942)   ondansetron  disintegrating tablet 4 mg (4 mg Oral Given 9/1/24 1938)   HYDROcodone-acetaminophen 7.5-325 mg per tablet 1 tablet (1 tablet Oral Given 9/1/24 2205)   ondansetron disintegrating tablet 4 mg (4 mg Oral Given 9/1/24 2205)     Medical Decision Making  Differential diagnosis considered but not limited to; shoulder strain shoulder sprain    Amount and/or Complexity of Data Reviewed  Radiology: ordered.    Risk  Prescription drug management.                                      Clinical Impression:  Final diagnoses:  [S43.401A] Sprain of right shoulder, unspecified shoulder sprain type, initial encounter (Primary)          ED Disposition Condition    Discharge Stable          ED Prescriptions       Medication Sig Dispense Start Date End Date Auth. Provider    HYDROcodone-acetaminophen (NORCO)  mg per tablet Take 1 tablet by mouth every 6 (six) hours as needed. 12 tablet 9/1/2024 -- Jose Daniel Cutler NP    diclofenac (VOLTAREN) 75 MG EC tablet Take 1 tablet (75 mg total) by mouth 2 (two) times daily. 14 tablet 9/1/2024 -- Jose Daniel Cutler NP          Follow-up Information       Follow up With Specialties Details Why Contact [facility], [facility]    [address] [place] [address] [address]  [place] [address]  [phone]               Jose Daniel Cutler NP  09/01/24 [id number]

## 2024-10-16 ENCOUNTER — HOSPITAL ENCOUNTER (EMERGENCY)
Facility: HOSPITAL | Age: 24
Discharge: HOME | End: 2024-10-16
Attending: EMERGENCY MEDICINE
Payer: MEDICAID

## 2024-10-16 VITALS
HEIGHT: 61 IN | RESPIRATION RATE: 18 BRPM | OXYGEN SATURATION: 99 % | TEMPERATURE: 98 F | HEART RATE: 65 BPM | WEIGHT: 113.81 LBS | DIASTOLIC BLOOD PRESSURE: 59 MMHG | BODY MASS INDEX: 21.49 KG/M2 | SYSTOLIC BLOOD PRESSURE: 102 MMHG

## 2024-10-16 DIAGNOSIS — G89.18 POST-OPERATIVE PAIN: Primary | ICD-10-CM

## 2024-10-16 LAB
ALBUMIN SERPL BCP-MCNC: 4 G/DL (ref 3.5–5.2)
ALP SERPL-CCNC: 64 U/L (ref 55–135)
ALT SERPL W/O P-5'-P-CCNC: 20 U/L (ref 10–44)
ANION GAP SERPL CALC-SCNC: 11 MMOL/L (ref 8–16)
AST SERPL-CCNC: 20 U/L (ref 10–40)
BACTERIA #/AREA URNS HPF: NORMAL /HPF
BACTERIA GENITAL QL WET PREP: ABNORMAL
BASOPHILS # BLD AUTO: 0.02 K/UL (ref 0–0.2)
BASOPHILS NFR BLD: 0.4 % (ref 0–1.9)
BILIRUB SERPL-MCNC: 0.4 MG/DL (ref 0.1–1)
BILIRUB UR QL STRIP: NEGATIVE
BUN SERPL-MCNC: 16 MG/DL (ref 6–20)
CALCIUM SERPL-MCNC: 9.4 MG/DL (ref 8.7–10.5)
CAOX CRY URNS QL MICRO: NORMAL
CHLORIDE SERPL-SCNC: 107 MMOL/L (ref 95–110)
CLARITY UR: CLEAR
CLUE CELLS VAG QL WET PREP: ABNORMAL
CO2 SERPL-SCNC: 20 MMOL/L (ref 23–29)
COLOR UR: YELLOW
CREAT SERPL-MCNC: 0.9 MG/DL (ref 0.5–1.4)
DIFFERENTIAL METHOD BLD: NORMAL
EOSINOPHIL # BLD AUTO: 0.2 K/UL (ref 0–0.5)
EOSINOPHIL NFR BLD: 4.5 % (ref 0–8)
ERYTHROCYTE [DISTWIDTH] IN BLOOD BY AUTOMATED COUNT: 12.7 % (ref 11.5–14.5)
EST. GFR  (NO RACE VARIABLE): >60 ML/MIN/1.73 M^2
FILAMENT FUNGI VAG WET PREP-#/AREA: ABNORMAL
GLUCOSE SERPL-MCNC: 71 MG/DL (ref 70–110)
GLUCOSE UR QL STRIP: NEGATIVE
HCG INTACT+B SERPL-ACNC: 523 MIU/ML
HCT VFR BLD AUTO: 40 % (ref 37–48.5)
HCV AB SERPL QL IA: NEGATIVE
HEP C VIRUS HOLD SPECIMEN: NORMAL
HGB BLD-MCNC: 13.1 G/DL (ref 12–16)
HGB UR QL STRIP: ABNORMAL
HIV 1+2 AB+HIV1 P24 AG SERPL QL IA: NEGATIVE
IMM GRANULOCYTES # BLD AUTO: 0.01 K/UL (ref 0–0.04)
IMM GRANULOCYTES NFR BLD AUTO: 0.2 % (ref 0–0.5)
KETONES UR QL STRIP: NEGATIVE
LACTATE SERPL-SCNC: 1.1 MMOL/L (ref 0.5–2.2)
LEUKOCYTE ESTERASE UR QL STRIP: NEGATIVE
LYMPHOCYTES # BLD AUTO: 1.6 K/UL (ref 1–4.8)
LYMPHOCYTES NFR BLD: 35.5 % (ref 18–48)
MCH RBC QN AUTO: 29.1 PG (ref 27–31)
MCHC RBC AUTO-ENTMCNC: 32.8 G/DL (ref 32–36)
MCV RBC AUTO: 89 FL (ref 82–98)
MICROSCOPIC COMMENT: NORMAL
MONOCYTES # BLD AUTO: 0.4 K/UL (ref 0.3–1)
MONOCYTES NFR BLD: 9.3 % (ref 4–15)
NEUTROPHILS # BLD AUTO: 2.3 K/UL (ref 1.8–7.7)
NEUTROPHILS NFR BLD: 50.1 % (ref 38–73)
NITRITE UR QL STRIP: NEGATIVE
NRBC BLD-RTO: 0 /100 WBC
PH UR STRIP: 6 [PH] (ref 5–8)
PLATELET # BLD AUTO: 265 K/UL (ref 150–450)
PMV BLD AUTO: 10.9 FL (ref 9.2–12.9)
POTASSIUM SERPL-SCNC: 4.2 MMOL/L (ref 3.5–5.1)
PROCALCITONIN SERPL IA-MCNC: <0.02 NG/ML
PROT SERPL-MCNC: 7.8 G/DL (ref 6–8.4)
PROT UR QL STRIP: ABNORMAL
RBC # BLD AUTO: 4.5 M/UL (ref 4–5.4)
RBC #/AREA URNS HPF: 1 /HPF (ref 0–4)
SODIUM SERPL-SCNC: 138 MMOL/L (ref 136–145)
SP GR UR STRIP: >1.03 (ref 1–1.03)
SPECIMEN SOURCE: ABNORMAL
SQUAMOUS #/AREA URNS HPF: 5 /HPF
T VAGINALIS GENITAL QL WET PREP: ABNORMAL
URN SPEC COLLECT METH UR: ABNORMAL
UROBILINOGEN UR STRIP-ACNC: NEGATIVE EU/DL
WBC # BLD AUTO: 4.62 K/UL (ref 3.9–12.7)
WBC #/AREA VAG WET PREP: ABNORMAL
YEAST GENITAL QL WET PREP: ABNORMAL

## 2024-10-16 PROCEDURE — 63600175 PHARM REV CODE 636 W HCPCS: Performed by: EMERGENCY MEDICINE

## 2024-10-16 PROCEDURE — 83605 ASSAY OF LACTIC ACID: CPT | Performed by: EMERGENCY MEDICINE

## 2024-10-16 PROCEDURE — 86803 HEPATITIS C AB TEST: CPT | Performed by: EMERGENCY MEDICINE

## 2024-10-16 PROCEDURE — 87389 HIV-1 AG W/HIV-1&-2 AB AG IA: CPT | Performed by: EMERGENCY MEDICINE

## 2024-10-16 PROCEDURE — 80053 COMPREHEN METABOLIC PANEL: CPT | Performed by: EMERGENCY MEDICINE

## 2024-10-16 PROCEDURE — 81000 URINALYSIS NONAUTO W/SCOPE: CPT | Performed by: EMERGENCY MEDICINE

## 2024-10-16 PROCEDURE — 84145 PROCALCITONIN (PCT): CPT | Performed by: EMERGENCY MEDICINE

## 2024-10-16 PROCEDURE — 96375 TX/PRO/DX INJ NEW DRUG ADDON: CPT

## 2024-10-16 PROCEDURE — 96374 THER/PROPH/DIAG INJ IV PUSH: CPT

## 2024-10-16 PROCEDURE — 87040 BLOOD CULTURE FOR BACTERIA: CPT | Mod: 59 | Performed by: EMERGENCY MEDICINE

## 2024-10-16 PROCEDURE — 96376 TX/PRO/DX INJ SAME DRUG ADON: CPT

## 2024-10-16 PROCEDURE — 85025 COMPLETE CBC W/AUTO DIFF WBC: CPT | Performed by: EMERGENCY MEDICINE

## 2024-10-16 PROCEDURE — 87591 N.GONORRHOEAE DNA AMP PROB: CPT | Performed by: EMERGENCY MEDICINE

## 2024-10-16 PROCEDURE — 25500020 PHARM REV CODE 255: Performed by: EMERGENCY MEDICINE

## 2024-10-16 PROCEDURE — 87210 SMEAR WET MOUNT SALINE/INK: CPT | Performed by: EMERGENCY MEDICINE

## 2024-10-16 PROCEDURE — 25000003 PHARM REV CODE 250: Performed by: EMERGENCY MEDICINE

## 2024-10-16 PROCEDURE — 84702 CHORIONIC GONADOTROPIN TEST: CPT | Performed by: EMERGENCY MEDICINE

## 2024-10-16 PROCEDURE — 99284 EMERGENCY DEPT VISIT MOD MDM: CPT | Mod: 25

## 2024-10-16 RX ORDER — PROMETHAZINE HYDROCHLORIDE 12.5 MG/1
12.5 TABLET ORAL
Status: COMPLETED | OUTPATIENT
Start: 2024-10-16 | End: 2024-10-16

## 2024-10-16 RX ORDER — FENTANYL CITRATE 50 UG/ML
75 INJECTION, SOLUTION INTRAMUSCULAR; INTRAVENOUS
Status: COMPLETED | OUTPATIENT
Start: 2024-10-16 | End: 2024-10-16

## 2024-10-16 RX ORDER — OXYCODONE HYDROCHLORIDE 5 MG/1
5 CAPSULE ORAL EVERY 6 HOURS PRN
Qty: 12 CAPSULE | Refills: 0 | Status: SHIPPED | OUTPATIENT
Start: 2024-10-16

## 2024-10-16 RX ORDER — ONDANSETRON HYDROCHLORIDE 2 MG/ML
4 INJECTION, SOLUTION INTRAVENOUS ONCE
Status: COMPLETED | OUTPATIENT
Start: 2024-10-16 | End: 2024-10-16

## 2024-10-16 RX ORDER — PROMETHAZINE HYDROCHLORIDE 12.5 MG/1
12.5 TABLET ORAL EVERY 6 HOURS PRN
Qty: 16 TABLET | Refills: 0 | Status: SHIPPED | OUTPATIENT
Start: 2024-10-16 | End: 2024-10-22

## 2024-10-16 RX ORDER — KETOROLAC TROMETHAMINE 30 MG/ML
30 INJECTION, SOLUTION INTRAMUSCULAR; INTRAVENOUS
Status: DISCONTINUED | OUTPATIENT
Start: 2024-10-16 | End: 2024-10-16 | Stop reason: HOSPADM

## 2024-10-16 RX ORDER — OXYCODONE HYDROCHLORIDE 5 MG/1
5 TABLET ORAL ONCE AS NEEDED
Status: COMPLETED | OUTPATIENT
Start: 2024-10-16 | End: 2024-10-16

## 2024-10-16 RX ORDER — KETOROLAC TROMETHAMINE 30 MG/ML
30 INJECTION, SOLUTION INTRAMUSCULAR; INTRAVENOUS
Status: COMPLETED | OUTPATIENT
Start: 2024-10-16 | End: 2024-10-16

## 2024-10-16 RX ADMIN — FENTANYL CITRATE 75 MCG: 50 INJECTION INTRAMUSCULAR; INTRAVENOUS at 02:10

## 2024-10-16 RX ADMIN — ONDANSETRON 4 MG: 2 INJECTION INTRAMUSCULAR; INTRAVENOUS at 11:10

## 2024-10-16 RX ADMIN — KETOROLAC TROMETHAMINE 30 MG: 30 INJECTION, SOLUTION INTRAMUSCULAR at 12:10

## 2024-10-16 RX ADMIN — ONDANSETRON 4 MG: 2 INJECTION INTRAMUSCULAR; INTRAVENOUS at 03:10

## 2024-10-16 RX ADMIN — OXYCODONE HYDROCHLORIDE 5 MG: 5 TABLET ORAL at 12:10

## 2024-10-16 RX ADMIN — PROMETHAZINE HYDROCHLORIDE 12.5 MG: 12.5 TABLET ORAL at 04:10

## 2024-10-16 RX ADMIN — IOHEXOL 100 ML: 350 INJECTION, SOLUTION INTRAVENOUS at 01:10

## 2024-10-16 NOTE — ED PROVIDER NOTES
Emergency Medicine Provider Note - 10/16/2024       SCRIBE NOTE: IWalter, am scribing for, and in the presence of Aissatou Fabian DO, FACEP     History     Chief Complaint   Patient presents with    Post-op Problem     Pt had a miscarriage and went in for a D/C on 10/11. Patient ran out of pain medication and is still in pain from procedure. +N. Pt complaining of burning with urination       Allergies:  Review of patient's allergies indicates:   Allergen Reactions    Fluconazole Anaphylaxis and Hives    Latex Itching and Other (See Comments)     Redness    Prednisone Palpitations       History of Present Illness   HPI    10/16/2024, 11:14 AM  The history is provided by the old chart, boyfriend and patient    Ruslan Perez is a 24 y.o. female presenting to the ED for a post operation problem. Pt has history of ADHD, bipolar disorder, polycystic ovarian syndrome.  Patient is G2 para 1.  Patient apparently had a gestational sac in the uterus. ?  Blighted ovum.  Patient was followed at [facility]'s.  Apparently sought a 2nd opinion out in [place].  Patient presented to a outlying facility.  Ultrasound performed on October 7, 2024 showed gestational sac within the uterus with mean sac diameter corresponding to a gestational age of 5 weeks 2 days.  No fetal pole or yolk sac identified.  Patient underwent D&C on October 11, 2024 at an outlying facility.  She was seen at [facility] emergency care services in [facility].  Patient had a repeat ultrasound performed on October 12, 2024:  Which showed normal endometrial stripe thickness of 3 mm with associated trace anechoic fluid in the endometrial canal.  No specific retained products.  The right ovary has normal flow.  Left ovary has normal flow.  No free fluid.    Pt states she is experiencing dysuria, nausea, cramps, vaginal bleeding, generalized myalgia, and lack of appetite. She denies any fever, CP, or chest tightness.    Pt received norco from  hospital with no relief. Pt was then prescribed morphine with initial relief but pain returned. Pt was then prescribed oxycodone      Arrival mode: Private Vehicle     PCP: No, Primary Doctor     Past Medical History:  Past Medical History:   Diagnosis Date    ADHD (attention deficit hyperactivity disorder)     Anxiety     Anxiety state 11/14/2016 8:45:01 AM    [facility]: Anxiety-No Additional Notes    Anxiety state 11/14/2016 8:45:01 AM    [facility]: Anxiety-No Additional Notes    Calculus of kidney 8/20/2018 11:46:04 AM    [facility] - [facility]: Kidney stones, calcium oxalate-No Additional Notes    Calculus of kidney 8/20/2018 11:46:04 AM    [facility] - [facility]: Kidney stones, calcium oxalate-No Additional Notes    Depression     History of chlamydia     Infection of kidney 8/20/2018 11:46:21 AM    [facility]: Kidney Infection-No Additional Notes    Infection of kidney 8/20/2018 11:46:21 AM    [facility]: Kidney Infection-No Additional Notes    Lactose intolerance     Migraine headache     Other and unspecified ovarian cyst 6/27/2016 12:55:46 PM    [facility]: Ovarian Cyst-No Additional Notes    Other and unspecified ovarian cyst 6/27/2016 12:55:46 PM    [facility]: Ovarian Cyst-No Additional Notes    Other depressive disorder 11/14/2016 8:45:02 AM    [facility]: Depression-No Additional Notes    Other depressive disorder 11/14/2016 8:45:02 AM    [facility]: Depression-No Additional Notes    Renal disorder     kidney stones    TIA (transient ischemic attack)     Unspecified chlamydial infection, in conditions classified elsewhere and of unspecified site 9/28/2017 3:59:08 PM    [facility]: Chlamydia-No Additional Notes    Unspecified chlamydial infection, in conditions classified elsewhere and of unspecified  site 9/28/2017 3:59:08 PM    [facility]: Chlamydia-No Additional Notes    Urinary tract infection 8/20/2018 11:46:40 AM    [facility] Urology: Urinary Tract Infection-No Additional Notes    Urinary tract infection 8/20/2018 11:46:40 AM    [facility] Urology: Urinary Tract Infection-No Additional Notes       Past Surgical History:  Past Surgical History:   Procedure Laterality Date    blood vessel removed from chest      BREAST SURGERY  03/22/2019    kidney stent      Mirena      placed 2 years ago         Family History:  Family History   Problem Relation Name Age of Onset    Breast cancer Paternal Grandmother      Irritable bowel syndrome Mother      Breast cancer Paternal Aunt x2     Colon cancer Neg Hx      Ovarian cancer Neg Hx         Social History:  Social History     Tobacco Use    Smoking status: Some Days     Current packs/day: 0.50     Types: Vaping with nicotine, Cigarettes    Smokeless tobacco: Current   Substance and Sexual Activity    Alcohol use: No    Drug use: No    Sexual activity: Yes     Partners: Male     Birth control/protection: None       I have reviewed the Past Medical History, Past Surgical History, Family History and Social History as documented above.      Review of Systems   Review of Systems   Constitutional:  Positive for appetite change (lack of appetite) and chills. Negative for fever.   Respiratory:  Negative for chest tightness and shortness of breath.    Cardiovascular:  Negative for chest pain.   Gastrointestinal:  Positive for abdominal pain and nausea. Negative for diarrhea and vomiting.   Genitourinary:  Positive for dysuria and vaginal bleeding.   Musculoskeletal:  Positive for myalgias (generalized).   Psychiatric/Behavioral:  The patient is nervous/anxious.           Physical Exam     Initial Vitals [10/16/24 1015]   BP Pulse Resp Temp SpO2   (!) 98/55 72 18 97.8 °F (36.6 °C) 98 %      MAP       --          Physical  "Exam    Nursing Notes and Vital Signs Reviewed.  Constitutional: Patient is in moderate distress. Well-developed and well-nourished.  Head: Atraumatic. Normocephalic.  Eyes: PERRL. EOM intact. Conjunctivae are not pale. No scleral icterus.  ENT: Mucous membranes are moist. Oropharynx is clear and symmetric.    Neck: Supple. Full ROM. No lymphadenopathy.  Cardiovascular: Regular rate. Regular rhythm. No murmurs, rubs, or gallops. Distal pulses are 2+ and symmetric.  Pulmonary/Chest: No respiratory distress. Clear to auscultation bilaterally. No wheezing or rales.  Abdominal: Soft and non-distended.  There is no tenderness.  No rebound, guarding, or rigidity. Good bowel sounds.  Musculoskeletal: Moves all extremities. No obvious deformities. No edema. No calf tenderness.  Skin: Warm and dry.  Neurological:  Alert, awake, and appropriate.  Normal speech.  No acute focal neurological deficits are appreciated.  Psychiatric: Tearful and rocking. Good eye contact. Appropriate in content.      3:02 PM   Genitourinary:  Chaperoned exam.  Verbal permission given.  Normal external exam.  No drainage.  No odor.  There is scant amount of brownish discharge present.  No cervical motion tenderness.  No adnexal tenderness     ED Course   ED Procedures:  Procedures    ED Vital Signs:  Vitals:    10/16/24 1015 10/16/24 1100 10/16/24 1230 10/16/24 1244   BP: (!) 98/55 (!) 88/50 112/69    Pulse: 72 85 80 69   Resp: 18 18 20 18   Temp: 97.8 °F (36.6 °C)   97.8 °F (36.6 °C)   TempSrc: Oral   Oral   SpO2: 98% 99% 98%    Weight: 51.6 kg (113 lb 12.8 oz)      Height: 5' 1" (1.549 m)       10/16/24 1253 10/16/24 1321 10/16/24 1354 10/16/24 1400   BP:  102/73  (!) 116/58   Pulse:  69 72 82   Resp: 18 18 18 18   Temp:   97.8 °F (36.6 °C)    TempSrc:   Oral    SpO2:  100%  96%   Weight:       Height:        10/16/24 1431 10/16/24 1502 10/16/24 1530   BP:  107/74 110/78   Pulse:  74 77   Resp: 18 18 18   Temp:  97.8 °F (36.6 °C)    TempSrc:  " Oral    SpO2:  100% 99%   Weight:      Height:          All Lab Results:  Results for orders placed or performed during the hospital encounter of 10/16/24   HIV 1/2 Ag/Ab (4th Gen)    Collection Time: 10/16/24 11:39 AM   Result Value Ref Range    HIV 1/2 Ag/Ab Negative Negative   Hepatitis C Antibody    Collection Time: 10/16/24 11:39 AM   Result Value Ref Range    Hepatitis C Ab Negative Negative   HCV Virus Hold Specimen    Collection Time: 10/16/24 11:39 AM   Result Value Ref Range    HEP C Virus Hold Specimen Hold for HCV sendout    HCG, Quantitative    Collection Time: 10/16/24 11:39 AM   Result Value Ref Range    HCG Quant 523 See Text mIU/mL   CBC Auto Differential    Collection Time: 10/16/24 11:39 AM   Result Value Ref Range    WBC 4.62 3.90 - 12.70 K/uL    RBC 4.50 4.00 - 5.40 M/uL    Hemoglobin 13.1 12.0 - 16.0 g/dL    Hematocrit 40.0 37.0 - 48.5 %    MCV 89 82 - 98 fL    MCH 29.1 27.0 - 31.0 pg    MCHC 32.8 32.0 - 36.0 g/dL    RDW 12.7 11.5 - 14.5 %    Platelets 265 150 - 450 K/uL    MPV 10.9 9.2 - 12.9 fL    Immature Granulocytes 0.2 0.0 - 0.5 %    Gran # (ANC) 2.3 1.8 - 7.7 K/uL    Immature Grans (Abs) 0.01 0.00 - 0.04 K/uL    Lymph # 1.6 1.0 - 4.8 K/uL    Mono # 0.4 0.3 - 1.0 K/uL    Eos # 0.2 0.0 - 0.5 K/uL    Baso # 0.02 0.00 - 0.20 K/uL    nRBC 0 0 /100 WBC    Gran % 50.1 38.0 - 73.0 %    Lymph % 35.5 18.0 - 48.0 %    Mono % 9.3 4.0 - 15.0 %    Eosinophil % 4.5 0.0 - 8.0 %    Basophil % 0.4 0.0 - 1.9 %    Differential Method Automated    Comprehensive Metabolic Panel    Collection Time: 10/16/24 11:39 AM   Result Value Ref Range    Sodium 138 136 - 145 mmol/L    Potassium 4.2 3.5 - 5.1 mmol/L    Chloride 107 95 - 110 mmol/L    CO2 20 (L) 23 - 29 mmol/L    Glucose 71 70 - 110 mg/dL    BUN 16 6 - 20 mg/dL    Creatinine 0.9 0.5 - 1.4 mg/dL    Calcium 9.4 8.7 - 10.5 mg/dL    Total Protein 7.8 6.0 - 8.4 g/dL    Albumin 4.0 3.5 - 5.2 g/dL    Total Bilirubin 0.4 0.1 - 1.0 mg/dL    Alkaline Phosphatase 64  55 - 135 U/L    AST 20 10 - 40 U/L    ALT 20 10 - 44 U/L    eGFR >60 >60 mL/min/1.73 m^2    Anion Gap 11 8 - 16 mmol/L   Procalcitonin    Collection Time: 10/16/24 11:39 AM   Result Value Ref Range    Procalcitonin <0.02 <0.25 ng/mL   Lactic acid, plasma    Collection Time: 10/16/24 11:39 AM   Result Value Ref Range    Lactate (Lactic Acid) 1.1 0.5 - 2.2 mmol/L   Urinalysis Catheterized    Collection Time: 10/16/24  1:20 PM   Result Value Ref Range    Specimen UA Urine, Clean Catch     Color, UA Yellow Yellow, Straw, Piedad    Appearance, UA Clear Clear    pH, UA 6.0 5.0 - 8.0    Specific Gravity, UA >1.030 (A) 1.005 - 1.030    Protein, UA Trace (A) Negative    Glucose, UA Negative Negative    Ketones, UA Negative Negative    Bilirubin (UA) Negative Negative    Occult Blood UA 1+ (A) Negative    Nitrite, UA Negative Negative    Urobilinogen, UA Negative <2.0 EU/dL    Leukocytes, UA Negative Negative   Urinalysis Microscopic    Collection Time: 10/16/24  1:20 PM   Result Value Ref Range    RBC, UA 1 0 - 4 /hpf    Bacteria Rare None-Occ /hpf    Squam Epithel, UA 5 /hpf    Ca Oxalate Kenna, UA Occasional None-Moderate    Microscopic Comment SEE COMMENT    Vaginal Screen    Collection Time: 10/16/24  3:08 PM    Specimen: Vagina; Genital   Result Value Ref Range    Trichomonas None None    Clue Cells None None    Budding Yeast None None    Fungal Hyphae None None    WBC - Vaginal Screen Few (A) None    Bacteria - Vaginal Screen Many (A) None    Wet Prep Source VAG          Reviewed Prior Labs:   10/26/16 15:15   Group & Rh A POS   INDIRECT CHRISTIANA NEG        Ref Range & Units 9 d ago   hCG Quant mIU/mL 3,647.4   Resulting Agency  [facility]   Narrative     Ref Range & Units 4 d ago   White Blood Cell Count 4.0 - 11.1 10*9/L 7.7   Red Blood Cell Count 4.18 - 5.64 10*12/L 4.15 Low    Hemoglobin 12.1 - 16.3 g/dL 12.2   Hematocrit 35.7 - 46.7 % 36.9   Mean Corpuscular Volume 80.0 - 100.0 fL 88.9   Mean Corpuscular Hemoglobin 27.5  - 35.1 pg 29.4   Mean Corpuscular Hemoglobin Concentration 32.0 - 36.0 g/dL 33.1   Platelet Count 150 - 400 10*9/L 231   Red Cell Distribution Width CV 11.7 - 14.2 % 12.8   NRBC Percent <=0.0 % 0.0   NRBC Absolute 0 - 0.1 10*9/L 0.00   Resulting Agency  [place]   Specimen Collected: 10/12/24 09:02    Performed by: [place] Last Resulted: 10/12/24 09:20         Imaging Results:  Imaging Results              CT Abdomen Pelvis With IV Contrast NO Oral Contrast (Final result)  Result time 10/16/24 13:28:00      Final result by Jennie BlevinsHair), MD (10/16/24 13:28:00)                   Impression:      Unremarkable CT scan of the abdomen and pelvis.    All CT scans at this facility use dose modulation, iterative reconstructions, and/or weight base dosing when appropriate to reduce radiation dose to as low as reasonably achievable      Electronically signed by: Jennie Blevins MD  Date:    10/16/2024  Time:    13:28               Narrative:    EXAMINATION:  CT ABDOMEN PELVIS WITH IV CONTRAST    CLINICAL HISTORY:  Abdominal pain, post-op;Patient is status post D&C.  Sounds like patient had a blighted ovum.  She is complaining of lower abdominal pain;    TECHNIQUE:  Postcontrast images were obtained    COMPARISON:  CT abdomen pelvis 06/15/2023    FINDINGS:  Small area of focal increased density left lateral costophrenic angle could represent low-grade infiltrate.  Elsewhere the lung bases are clear.    The liver, the spleen, and the pancreas appear normal.    The gallbladder is unremarkable.  There is no bile duct dilatation.    No hydronephrosis.  There are couple small left renal cyst.  No suspicious renal masses    The aorta and inferior vena cava are unremarkable.    There are no acute bowel abnormalities.   No evidence of appendicitis.    Bladder is normal. No abnormal masses or fluid collections in the pelvis.    Skeletal structures are intact.   "No acute skeletal findings.                                            The Emergency Provider reviewed the vital signs and test results, which are outlined above.     ED Discussion   ED Medication(s):  Medications   ketorolac injection 30 mg (30 mg Intravenous Not Given 10/16/24 1147)   ondansetron injection 4 mg (4 mg Intravenous Given 10/16/24 1147)   ketorolac injection 30 mg (30 mg Intravenous Given 10/16/24 1213)   oxyCODONE immediate release tablet 5 mg (5 mg Oral Given 10/16/24 1253)   iohexoL (OMNIPAQUE 350) injection 100 mL (100 mLs Intravenous Given 10/16/24 1308)   fentaNYL 50 mcg/mL injection 75 mcg (75 mcg Intravenous Given 10/16/24 1431)   ondansetron injection 4 mg (4 mg Intravenous Given 10/16/24 1513)   promethazine tablet 12.5 mg (12.5 mg Oral Given 10/16/24 1620)       ED Course as of 10/16/24 1639   Wed Oct 16, 2024   1206 WBC: 4.62 [LB]   1206 Hemoglobin: 13.1 [LB]   1206 Hematocrit: 40.0  Patient reports that she was frustrated that we are offering ketorolac.  I discussed that ketorolac can help with prostaglandin blocking and to help with gynecological pain.  Patient states that she has been through this before and knows that ketorolac will not work.  However she is willing to try ketorolac [LB]   1213 From 4 days ago    Blood Urea Nitrogen 7 - 25 mg/dL 12   Creatinine Serum/Plasma 0.60 - 1.20 mg/dL 0.80      [LB]   1230 Beta HCG Quant: 523 [LB]   1230 Lactic Acid Level: 1.1  Patient refusing catheter as she is" too much pain" [LB]   1234 ef Range & Units 9 d ago   hCG Quant mIU/mL 3,647.4  Resulting Agency  [facility]  Narrative  Performed by [facility]  Normal Nonpregnant Females: <4.0 mIU/ml  Post-Menopausal Females: <6.5 mIU/ml  Normal Males: <3.0 mIU/ml    Pregnancy Normals:  Gestational Age:        Mean:          HCG Range (mIU/ml):  1-10 weeks               [id number]          [id number]  11-15 weeks              [id number]          [id number]    16-22 weeks              75959          " [id number]  23-40 weeks              [id number]  Specimen Collected: 10/07/24 09:53   Performed by: [facility] Last Resulted: 10/07/24 10:39  Received From: [facility]  Result Received: 10/09/24 09:29     [LB]   1321 10/26/16 15:15  Group & Rh: A POS  INDIRECT CHRISTIANA: NEG [LB]   1352 Spoke with Dr. Delcid via phone.  Patient is having no evidence of any active infection.  CT scan does not show perforation.  He recommends patient have prescription for couple of days.  Follow up with Gynecology [LB]   1503 Patient understood not taking her anxiety medications with the oral pain medicine.  Patient was advised to return emergency room for fever, worsening pain, nausea, vomiting.  Patient verbalized understanding.  All questions answered [LB]   1547 Patient is still complaining of nausea.  Requesting Phenergan [LB]   1637 Patient is symptomatically feels better.  Vital signs are stable. [LB]      ED Course User Index  [LB] Aissatou Fabian, DO            1637 Reassessment: Dr. Fabian reassessed the pt.  The pt is resting comfortably and is NAD.  Pt states their sx have improved. Discussed test results, shared treatment plan, specific conditions for return, and the need for f/u.  Answered their questions at this time.  Pt understands and agrees to the plan.  The pt has remained hemodynamically stable through ED course and is stable for discharge.    I discussed with patient and/or family/caretaker that evaluation in the ED does not suggest any emergent or life threatening medical conditions requiring immediate intervention beyond what was provided in the ED, and I believe patient is safe for discharge.  Regardless, an unremarkable evaluation in the ED does not preclude the development or presence of a serious of life threatening condition. As such, patient was instructed to return immediately for any worsening or change in current symptoms.     MIPS Measures     Smoker? Yes     Hypertension: Patient  did not have any elevated blood pressures in the Emergency Department.    E-Qual Opioid Initiative-OUD    Intent:  Reducing Opioid Associated Harm through safe prescribing.  Justification: Improve opioid prescribing safety, adopt harm reduction strategies, and implement alterative to opioids.    Best Practice:  Opioids were limited to the shortest duration possible, Patient educated on opioid risks and realistic benefits, Non-opioid pain relievers recommended, and State  checked prior to prescribing    Regarding  OPIOID PAIN MEDICATION  counseling. The patient/family was educated on the risks of using opioid pain medications.   These risks include, but not limited to:  1/50 chance of addiction, increase risk of falls/injury, gastrointestinal issues (Constipation),  or accidental death.  Patient/Family was educated on  pain medication Interactions. Do not take any sedative medications (benadryl, ativan, xanax, Klonopin, trazadone); medicine for sleep; other pain pills (lortab, percocet), alcohol, with your narcotic prescription.  This could lead to an accidental overdose resulting in permanent disability or possible death.  Patient/Family was encouraged to use non-narcotic alternatives and to limit the use of narcotic medication.      Medical Decision Making                 Medical Decision Making  Differential Diagnosis:  Endometritis, uterine rupture, urinary tract infection,    ED course: Patient had IV established.  Recommended ketorolac IV.  White cell count 4.62.  Hemoglobin/hematocrit 13.1/40.4.  Previous hemoglobin/hematocrit on October 12, 2024 was 12.2/36.9.  Hemoglobin/hematocrit appears to be stable.  No left shift.  Blood type: A +.  Quantitative hCG 532, prior 3674.  Quantitative hCG currently today 523.  Liver enzymes normal.  Procalcitonin less than 0.02.  Lactic acid 1.1.  Patient had ultrasound performed on October 12, 2024 transvaginal ultrasound with 3 mm endometrial stripe.  Trace anechoic  "fluid in the endometrial canal.  Normal flow ovaries.  Physiologic follicles.  Patient underwent CT abdomen pelvis: "Small area of focal increased density left lateral costophrenic angle could represent low-grade infiltrate" clinically I am not suspicious of pneumonia.  No abnormal masses or fluid collections in the pelvis.  Patient underwent pelvic exam.  Slight reddish vaginal discharge.  No cervical motion tenderness.  Patient was given multiple doses of IV pain medicine as well as anti emetics.  Case was discussed verbally with on-call gynecology.  Patient appears to have stable hemoglobin, no urinary tract infection, negative SIRS criteria, no perforated viscus.  Recommend patient be discharged home with continued pain medicine.  Return precautions given.    Amount and/or Complexity of Data Reviewed  External Data Reviewed: notes.     Details: Which showed no free fluid Shanique Quach MD - 10/12/2024 10:42 AM MDT  Associated Order(s): ED Consult to Gynecology  Formatting of this note is different from the original.  ED Consult to Gynecology  Consult performed by: Shanique Quach MD  Consult ordered by: Luis F Thomas MD    Consults  Gynecology Consult Note    Assessment/Plan    Ruslan Perez is a 24 y.o.  who presents to the ED after a D&C for an  at 5 weeks gestational age yesterday performed at an outside clinic. Patient reports the procedure was uncomplicated. She presents to the ED today with constant cramping lower abdominal pain. She is having some nausea and spotting but denies any heavy vaginal bleeding, fevers, or chills.    VSS. On exam, abdomen is soft with bilateral lower quadrant tenderness to palpation. No rebound or guarding. Pelvic exam deferred due to minimal vaginal bleeding.    Labs: Hgb 12.2, WBC 7.7, BMP and LFTs wnl. Lipase normal. UA negative for infection    Imaging: TVUS with 3 mm endometrial stripe and appropriate post-procedural appearance    Given " reassuring evaluation feel the patient is safe for discharge home at this time with strict return precautions including increased vaginal bleeding, abdominal pain not controlled with medications, and fevers/chills. Plan short course of oxycodone and zofran. Also discussed alternating tylenol and ibuprofen every 3 hours and the appropriate doses.    The patient was discussed with Dr. Goldy Quach MD  Obstetrics and Gynecology, PGY2       Imaging Results - US PELVIS W ENDOVAG AND DOPPLER (10/12/2024 9:34 AM MDT)  Impressions  10/12/2024 9:37 AM MDT     1.  Normal endometrial stripe thickness of 3 mm with associated trace anechoic fluid in the endometrial canal.  No specific evidence of retained products of conception.    CONTACT INFORMATION:    This exam was interpreted by a [facility] [employer] radiology physician with fellowship training. If there are any questions regarding this report or other radiology questions, please feel free to contact a radiologist directly at [phone] or if in the [facility] or clinics at [phone].    Final Report E-Signed By: Tello Kenney MD at 10/12/2024 9:37 AM  WSN:[id number]     Imaging Results - US PELVIS W ENDOVAG AND DOPPLER (10/12/2024 9:34 AM MDT)  Narrative  10/12/2024 9:37 AM MDT   EXAMINATION: ULTRASOUND PELVIS TRANSABDOMINAL AND ENDOVAGINAL WITH DOPPLER    DATE: 10/12/2024, 0934.    INDICATION: EVAL RETAINED PRODUCTS. RECENT , HYPOTENSIVE.    TECHNIQUE: Grayscale ultrasound evaluation of the pelvis was performed to assess the pelvic organs using transabdominal and transvaginal technique.  Color and spectral Doppler evaluation was performed to assess vessel patency and blood flow.    COMPARISON: None    FINDINGS:    The uterus is anteverted. The uterus measures 9.4 x 5.6 x 4.3 cm. There are no uterine fibroids. The endometrial stripe measures 3 mm in AP thickness. There is trace anechoic fluid in the endometrial  canal.    The right ovary measures 2.7 x 2.3 x 3.0 cm. Normal physiologic follicles. Normal low resistance arterial spectral Doppler waveform of the ovarian stroma.    The left ovary measures 2.8 x 1.8 x 3.0 cm. Normal physiologic follicles. Normal low resistance arterial spectral Doppler waveform of the ovarian stroma.    There is no free fluid.     Impression      1.  Normal endometrial stripe thickness of 3 mm with associated trace anechoic fluid in the endometrial canal.  No specific evidence of retained products of conception.    CONTACT INFORMATION:    This exam was interpreted by a [facility] of [employer] radiology physician with fellowship training. If there are any questions regarding this report or other radiology questions, please feel free to contact a radiologist directly at [phone] or if in the [facility] hospital or clinics at [phone].    Final Report E-Signed By: Tello Kenney MD at 10/12/2024 9:37 AM  WSN:[id number]  Narrative    EXAMINATION: ULTRASOUND PELVIS TRANSABDOMINAL AND ENDOVAGINAL WITH DOPPLER    DATE: 10/12/2024, 0934.    INDICATION: EVAL RETAINED PRODUCTS. RECENT , HYPOTENSIVE.    TECHNIQUE: Grayscale ultrasound evaluation of the pelvis was performed to assess the pelvic organs using transabdominal and transvaginal technique.  Color and spectral Doppler evaluation was performed to assess vessel patency and blood flow.    COMPARISON: None    FINDINGS:    The uterus is anteverted. The uterus measures 9.4 x 5.6 x 4.3 cm. There are no uterine fibroids. The endometrial stripe measures 3 mm in AP thickness. There is trace anechoic fluid in the endometrial canal.    The right ovary measures 2.7 x 2.3 x 3.0 cm. Normal physiologic follicles. Normal low resistance arterial spectral Doppler waveform of the ovarian stroma.    The left ovary measures 2.8 x 1.8 x 3.0 cm. Normal physiologic follicles. Normal low resistance arterial spectral Doppler waveform of the  ovarian stroma.    There is no free fluid.  Exam End: 10/12/24 10:34   Specimen Collected: 10/12/24 10:36 Last Resulted: 10/12/24 10:37  Received From: [facility]  Result Received: 10/16/24 09:48         Labs: ordered. Decision-making details documented in ED Course.  Radiology: ordered. Decision-making details documented in ED Course.    Risk  Prescription drug management.        Prescription Management: I performed a review of the patient's current Rx medication list as input by nursing staff.    Patient's Medications   New Prescriptions    OXYCODONE (OXY-IR) 5 MG CAP    Take 1 capsule (5 mg total) by mouth every 6 (six) hours as needed for Pain. Do not take any clonazepam or sleeping medication.   Previous Medications    ALBUTEROL (VENTOLIN HFA) 90 MCG/ACTUATION INHALER    Inhale 2 puffs into the lungs every 6 (six) hours as needed for Wheezing. Rescue    ALPRAZOLAM (XANAX) 0.5 MG TABLET    TK 1 T PO 30 MINUTES PRIOR TO PROCEDURE    AZELASTINE (ASTELIN) 137 MCG (0.1 %) NASAL SPRAY    2 sprays by Nasal route 2 (two) times daily.    AZITHROMYCIN (Z-JOSE RAFAEL) 250 MG TABLET    Take 1 tablet (250 mg total) by mouth once daily. Take first 2 tablets together, then 1 every day until finished.    BUSPIRONE (BUSPAR) 10 MG TABLET    TK 1 T PO TID    CETIRIZINE (ZYRTEC) 10 MG TABLET    Take 10 mg by mouth every morning.    CLOBETASOL (TEMOVATE) 0.05 % CREAM    Apply topically 2 (two) times daily.    CLONAZEPAM (KLONOPIN) 1 MG TABLET    1 PO BID X 3 days, then 1 PO every night X 3 days, then 1/2 PO every night X 4 days    CYCLOBENZAPRINE (FLEXERIL) 10 MG TABLET    Take 10 mg by mouth 3 (three) times daily.    DICLOFENAC (VOLTAREN) 75 MG EC TABLET    Take 1 tablet (75 mg total) by mouth 2 (two) times daily.    FLUOXETINE 40 MG CAPSULE    Take 40 mg by mouth once daily.    FLUTICASONE PROPIONATE (FLONASE) 50 MCG/ACTUATION NASAL SPRAY    2 sprays by Each Nostril route once daily.    HYDROCODONE-ACETAMINOPHEN (NORCO)  " MG PER TABLET    Take 1 tablet by mouth every 6 (six) hours as needed.    NAPROXEN (NAPROSYN) 375 MG TABLET    Take 1 tablet (375 mg total) by mouth 2 (two) times daily with meals.    ONDANSETRON (ZOFRAN) 4 MG TABLET    Take 1 tablet (4 mg total) by mouth every 6 (six) hours.    ONDANSETRON (ZOFRAN-ODT) 4 MG TBDL    Take 1 tablet (4 mg total) by mouth every 6 (six) hours as needed.    ONDANSETRON (ZOFRAN-ODT) 4 MG TBDL    Take 1 tablet (4 mg total) by mouth every 6 (six) hours as needed (vomiting).    OXYCODONE-ACETAMINOPHEN (PERCOCET)  MG PER TABLET    Take 1 tablet by mouth 3 (three) times daily.    OXYCODONE-ACETAMINOPHEN (PERCOCET) 5-325 MG PER TABLET    Take 1 tablet by mouth every 8 (eight) hours.    PANTOPRAZOLE (PROTONIX) 40 MG TABLET    Take 1 tablet by mouth every morning.    PRAZOSIN (MINIPRESS) 1 MG CAP    TK 1 C PO QD    PROMETHAZINE (PHENERGAN) 25 MG TABLET    Take 25 mg by mouth every 8 (eight) hours as needed.    QUETIAPINE (SEROQUEL) 100 MG TAB    Take 100 mg by mouth nightly.    TOBRAMYCIN SULFATE 0.3% (TOBREX) 0.3 % OPHTHALMIC SOLUTION    INTILL 3 DROPS IN RIGHT EYE Q 4 H FOR 7 DAYS UTD    ZONISAMIDE (ZONEGRAN) 100 MG CAP    Take 1 capsule by mouth every evening.   Modified Medications    No medications on file   Discontinued Medications    No medications on file        Discussed case verbally with:Obstetrics and Gynecology    Referrals:  No orders of the defined types were placed in this encounter.        Portions of this note may have been created with voice recognition software. Occasional "wrong-word" or "sound-a-like" substitutions may have occurred due to the inherent limitations of voice recognition software. Please, read the note carefully and recognize, using context, where substitutions have occurred.          Clinical Impression       ICD-10-CM ICD-9-CM   1. Post-operative pain  G89.18 338.18     ED Disposition  Disposition:   Disposition: Discharged  Condition: Stable  n: " Good    ED Follow-up   Follow-up Information       Jamin Delcid MD.    Specialty: Obstetrics and Gynecology  Why: Return to emergency department for fever, nausea, vomiting, confusion, worsening pain, other concerns  Contact information:  [address]  [place] [id number] [phone]                               Scribe Attestation:   Scribe #1: I, Walter Ribera,  performed the above scribed service and the documentation accurately describes the services I performed. I attest to the accuracy of the note.     Attending:   Physician Attestation Statement for Scribe #1: I, Aissatou Fabian DO, [facility], personally performed the services described in this documentation, as scribed by Walter Ribera, in my presence, and it is both accurate and complete.                   Aissatou Fabian DO  10/17/24 0828

## 2024-10-16 NOTE — DISCHARGE INSTRUCTIONS
Regarding  OPIOID PAIN MEDICATION  counseling. The patient/family was educated on the risks of using opioid pain medications.   These risks include, but not limited to:  1/50 chance of addiction, increase risk of falls/injury, gastrointestinal issues (Constipation),  or accidental death.  Patient/Family was educated on  pain medication Interactions.  Do not take any sedative medications (benadryl, ativan, xanax, Klonopin, trazadone); medicine for sleep; other pain pills (lortab, percocet), alcohol, with your narcotic prescription.  This could lead to an accidental overdose resulting in permanent disability or possible death.  Patient/Family was encouraged to use non-narcotic alternatives and to limit the use of narcotic medication.

## 2024-10-19 LAB
C TRACH DNA SPEC QL NAA+PROBE: NOT DETECTED
N GONORRHOEA DNA SPEC QL NAA+PROBE: NOT DETECTED

## 2024-10-21 LAB
BACTERIA BLD CULT: NORMAL
BACTERIA BLD CULT: NORMAL

## 2024-10-22 ENCOUNTER — OFFICE VISIT (OUTPATIENT)
Dept: OBSTETRICS AND GYNECOLOGY | Facility: CLINIC | Age: 24
End: 2024-10-22
Payer: MEDICAID

## 2024-10-22 ENCOUNTER — LAB VISIT (OUTPATIENT)
Dept: LAB | Facility: HOSPITAL | Age: 24
End: 2024-10-22
Attending: OBSTETRICS & GYNECOLOGY
Payer: MEDICAID

## 2024-10-22 VITALS — BODY MASS INDEX: 21.44 KG/M2 | HEIGHT: 61 IN | WEIGHT: 113.56 LBS

## 2024-10-22 DIAGNOSIS — O03.9 SAB (SPONTANEOUS ABORTION): Primary | ICD-10-CM

## 2024-10-22 DIAGNOSIS — O03.9 SAB (SPONTANEOUS ABORTION): ICD-10-CM

## 2024-10-22 LAB — HCG INTACT+B SERPL-ACNC: 99 MIU/ML

## 2024-10-22 PROCEDURE — 84702 CHORIONIC GONADOTROPIN TEST: CPT | Performed by: OBSTETRICS & GYNECOLOGY

## 2024-10-22 PROCEDURE — 99999 PR PBB SHADOW E&M-EST. PATIENT-LVL III: CPT | Mod: PBBFAC,,, | Performed by: OBSTETRICS & GYNECOLOGY

## 2024-10-22 PROCEDURE — 36415 COLL VENOUS BLD VENIPUNCTURE: CPT | Performed by: OBSTETRICS & GYNECOLOGY

## 2024-10-22 PROCEDURE — 99213 OFFICE O/P EST LOW 20 MIN: CPT | Mod: PBBFAC,TH | Performed by: OBSTETRICS & GYNECOLOGY

## 2024-10-22 NOTE — PROGRESS NOTES
"Subjective     Patient ID: Ruslan Perez is a 24 y.o. female.    Chief Complaint:  Follow-up      History of Present Illness  Follow-up  Associated symptoms include fatigue. Pertinent negatives include no abdominal pain, chest pain, chills, fever, headaches, nausea or vomiting.     Pt is s/p D+C on 10/11/24 in [place], [place] ([facility]) for a MAB.  Pt states she normally sees Dr. Gibson at  but was unable to get a sooner appointment with her, prompting her visit to [place].  Since the surgery, pt reports having issues with being "very hormonal".  Has noted significant mood swings, agitation, and hot flashes.  States she has significant anxiety issues that are controlled with medications.  Pt with history of Bipolar disorder but has not taken medications for this is several years.  Bleeding is light and still has some cramping pains.  Requests to have hormones checked.    GYN & OB History  Patient's last menstrual period was 2024 (approximate).   Date of Last Pap: 2022    OB History    Para Term  AB Living   1 1 1 0 0 1   SAB IAB Ectopic Multiple Live Births   0 0 0   1      # Outcome Date GA Lbr Marcel/2nd Weight Sex Type Anes PTL Lv   1 Term 16    M Vag-Spont EPI N JONATAN       Review of Systems  Review of Systems   Constitutional:  Positive for activity change, appetite change and fatigue. Negative for chills, fever and unexpected weight change.   Respiratory:  Negative for shortness of breath.    Cardiovascular:  Negative for chest pain, palpitations and leg swelling.   Gastrointestinal:  Negative for abdominal pain, bloating, blood in stool, constipation, diarrhea, nausea and vomiting.   Genitourinary:  Positive for hot flashes and vaginal bleeding. Negative for dysuria, flank pain, frequency, genital sores, hematuria, pelvic pain, urgency, vaginal discharge, vaginal pain, urinary incontinence, vaginal dryness and vaginal odor.   Musculoskeletal:  Negative for back pain. "   Neurological:  Negative for syncope and headaches.   Psychiatric/Behavioral:  Positive for sleep disturbance. Negative for depression. The patient is nervous/anxious.           Objective   Physical Exam:   Constitutional: She is oriented to person, place, and time. She appears well-developed and well-nourished. No distress.                           Neurological: She is alert and oriented to person, place, and time.     Psychiatric: She has a normal mood and affect. Her behavior is normal. Thought content normal.      HCG:  10/16 - 523  10/7 - 3647  10/4 - 1379  9/25 - 64        Assessment and Plan     1. SAB (spontaneous )           Plan:  SAB (spontaneous )  -     HCG, Quantitative; Future; Expected date: 10/22/2024  -     Pt is s/p D+C 10/11 in [place].  Pt was not given post-operative follow up and desires to follow up with me.    -     Pt is progressing well post-op and appears to be coping with loss.  Pt counseled on warning signs and indications for reporting to ER.  -     Pt counseled on option to begin hormonal contraception and pt declines.        Follow up in about 3 weeks (around 2024).

## 2024-10-30 ENCOUNTER — HOSPITAL ENCOUNTER (EMERGENCY)
Facility: HOSPITAL | Age: 24
Discharge: HOME | End: 2024-10-30
Attending: EMERGENCY MEDICINE
Payer: MEDICAID

## 2024-10-30 VITALS
WEIGHT: 111.75 LBS | BODY MASS INDEX: 21.1 KG/M2 | OXYGEN SATURATION: 90 % | RESPIRATION RATE: 19 BRPM | TEMPERATURE: 98 F | HEIGHT: 61 IN | SYSTOLIC BLOOD PRESSURE: 95 MMHG | DIASTOLIC BLOOD PRESSURE: 55 MMHG | HEART RATE: 77 BPM

## 2024-10-30 DIAGNOSIS — R10.2 PELVIC PAIN: Primary | ICD-10-CM

## 2024-10-30 LAB
ALBUMIN SERPL BCP-MCNC: 3.8 G/DL (ref 3.5–5.2)
ALP SERPL-CCNC: 69 U/L (ref 40–150)
ALT SERPL W/O P-5'-P-CCNC: 10 U/L (ref 10–44)
ANION GAP SERPL CALC-SCNC: 7 MMOL/L (ref 8–16)
AST SERPL-CCNC: 12 U/L (ref 10–40)
BASOPHILS # BLD AUTO: 0.03 K/UL (ref 0–0.2)
BASOPHILS NFR BLD: 0.3 % (ref 0–1.9)
BILIRUB SERPL-MCNC: 0.4 MG/DL (ref 0.1–1)
BILIRUB UR QL STRIP: NEGATIVE
BUN SERPL-MCNC: 15 MG/DL (ref 6–20)
CALCIUM SERPL-MCNC: 8.9 MG/DL (ref 8.7–10.5)
CHLORIDE SERPL-SCNC: 108 MMOL/L (ref 95–110)
CLARITY UR: CLEAR
CO2 SERPL-SCNC: 23 MMOL/L (ref 23–29)
COLOR UR: YELLOW
CREAT SERPL-MCNC: 0.9 MG/DL (ref 0.5–1.4)
DIFFERENTIAL METHOD BLD: NORMAL
EOSINOPHIL # BLD AUTO: 0.1 K/UL (ref 0–0.5)
EOSINOPHIL NFR BLD: 1.3 % (ref 0–8)
ERYTHROCYTE [DISTWIDTH] IN BLOOD BY AUTOMATED COUNT: 12 % (ref 11.5–14.5)
EST. GFR  (NO RACE VARIABLE): >60 ML/MIN/1.73 M^2
GLUCOSE SERPL-MCNC: 87 MG/DL (ref 70–110)
GLUCOSE UR QL STRIP: NEGATIVE
HCG INTACT+B SERPL-ACNC: 13 MIU/ML
HCT VFR BLD AUTO: 38.2 % (ref 37–48.5)
HGB BLD-MCNC: 12.9 G/DL (ref 12–16)
HGB UR QL STRIP: NEGATIVE
IMM GRANULOCYTES # BLD AUTO: 0.02 K/UL (ref 0–0.04)
IMM GRANULOCYTES NFR BLD AUTO: 0.2 % (ref 0–0.5)
KETONES UR QL STRIP: NEGATIVE
LEUKOCYTE ESTERASE UR QL STRIP: NEGATIVE
LYMPHOCYTES # BLD AUTO: 1.7 K/UL (ref 1–4.8)
LYMPHOCYTES NFR BLD: 18.8 % (ref 18–48)
MCH RBC QN AUTO: 29.3 PG (ref 27–31)
MCHC RBC AUTO-ENTMCNC: 33.8 G/DL (ref 32–36)
MCV RBC AUTO: 87 FL (ref 82–98)
MONOCYTES # BLD AUTO: 0.7 K/UL (ref 0.3–1)
MONOCYTES NFR BLD: 8 % (ref 4–15)
NEUTROPHILS # BLD AUTO: 6.4 K/UL (ref 1.8–7.7)
NEUTROPHILS NFR BLD: 71.4 % (ref 38–73)
NITRITE UR QL STRIP: NEGATIVE
NRBC BLD-RTO: 0 /100 WBC
PH UR STRIP: 7 [PH] (ref 5–8)
PLATELET # BLD AUTO: 318 K/UL (ref 150–450)
PMV BLD AUTO: 10.8 FL (ref 9.2–12.9)
POTASSIUM SERPL-SCNC: 3.7 MMOL/L (ref 3.5–5.1)
PROT SERPL-MCNC: 7.1 G/DL (ref 6–8.4)
PROT UR QL STRIP: NEGATIVE
RBC # BLD AUTO: 4.4 M/UL (ref 4–5.4)
SODIUM SERPL-SCNC: 138 MMOL/L (ref 136–145)
SP GR UR STRIP: 1.01 (ref 1–1.03)
URN SPEC COLLECT METH UR: NORMAL
UROBILINOGEN UR STRIP-ACNC: NEGATIVE EU/DL
WBC # BLD AUTO: 8.98 K/UL (ref 3.9–12.7)

## 2024-10-30 PROCEDURE — 96361 HYDRATE IV INFUSION ADD-ON: CPT

## 2024-10-30 PROCEDURE — 96365 THER/PROPH/DIAG IV INF INIT: CPT

## 2024-10-30 PROCEDURE — 81003 URINALYSIS AUTO W/O SCOPE: CPT | Performed by: EMERGENCY MEDICINE

## 2024-10-30 PROCEDURE — 85025 COMPLETE CBC W/AUTO DIFF WBC: CPT | Performed by: EMERGENCY MEDICINE

## 2024-10-30 PROCEDURE — 25000003 PHARM REV CODE 250: Performed by: EMERGENCY MEDICINE

## 2024-10-30 PROCEDURE — 63600175 PHARM REV CODE 636 W HCPCS: Performed by: EMERGENCY MEDICINE

## 2024-10-30 PROCEDURE — 84702 CHORIONIC GONADOTROPIN TEST: CPT | Performed by: EMERGENCY MEDICINE

## 2024-10-30 PROCEDURE — 99285 EMERGENCY DEPT VISIT HI MDM: CPT | Mod: 25

## 2024-10-30 PROCEDURE — 80053 COMPREHEN METABOLIC PANEL: CPT | Performed by: EMERGENCY MEDICINE

## 2024-10-30 RX ORDER — MORPHINE SULFATE 4 MG/ML
4 INJECTION, SOLUTION INTRAMUSCULAR; INTRAVENOUS
Status: DISCONTINUED | OUTPATIENT
Start: 2024-10-30 | End: 2024-10-30 | Stop reason: HOSPADM

## 2024-10-30 RX ORDER — KETOROLAC TROMETHAMINE 30 MG/ML
15 INJECTION, SOLUTION INTRAMUSCULAR; INTRAVENOUS
Status: DISCONTINUED | OUTPATIENT
Start: 2024-10-30 | End: 2024-10-30 | Stop reason: HOSPADM

## 2024-10-30 RX ORDER — HYDROCODONE BITARTRATE AND ACETAMINOPHEN 5; 325 MG/1; MG/1
1 TABLET ORAL
Status: COMPLETED | OUTPATIENT
Start: 2024-10-30 | End: 2024-10-30

## 2024-10-30 RX ORDER — HYDROCODONE BITARTRATE AND ACETAMINOPHEN 5; 325 MG/1; MG/1
1 TABLET ORAL EVERY 4 HOURS PRN
Qty: 11 TABLET | Refills: 0 | Status: SHIPPED | OUTPATIENT
Start: 2024-10-30 | End: 2024-11-04

## 2024-10-30 RX ADMIN — HYDROCODONE BITARTRATE AND ACETAMINOPHEN 1 TABLET: 5; 325 TABLET ORAL at 12:10

## 2024-10-30 RX ADMIN — SODIUM CHLORIDE 1000 ML: 9 INJECTION, SOLUTION INTRAVENOUS at 10:10

## 2024-10-30 RX ADMIN — PROMETHAZINE HYDROCHLORIDE 12.5 MG: 25 INJECTION INTRAMUSCULAR; INTRAVENOUS at 08:10

## 2024-10-30 NOTE — ED PROVIDER NOTES
SCRIBE #1 NOTE: I, Bill Nolen, am scribing for, and in the presence of, Edmond Vaughan MD. I have scribed the entire note.       History     Chief Complaint   Patient presents with    Abdominal Pain     LLQ pain onset yesterday. Pt had D&C 2.5 weeks ago. +nausea, dizziness.     Review of patient's allergies indicates:   Allergen Reactions    Fluconazole Anaphylaxis and Hives    Latex Itching and Other (See Comments)     Redness    Prednisone Palpitations         History of Present Illness     HPI    10/30/2024, 8:33 AM  History obtained from the patient      History of Present Illness: Ruslan Perez is a 24 y.o. female patient with a PMHx of ADHD, anxiety, depressive disorder, PCOS, chlamydial infection, renal disorder, depression, and renal disorder who presents to the Emergency Department for evaluation of LLQ abd pain which onset gradually since yesterday. Pain is located around her left ovary and radiates to the back; pt denies having any flank pain and believes it is a ruptured cyst. Pain is worsened with standing upright, moving, palpation, and urination. Pt denies any burning sensation with urination. Pt recently lost her baby and had a D&C 2.5 weeks ago. Pt is currently dizzy and nauseated. Symptoms are constant and moderate in severity. Patient denies any emesis, CP, SOB, fever, and all other sxs at this time. No prior Tx. No further complaints or concerns at this time.       Arrival mode: Personal vehicle     PCP: No, Primary Doctor        Past Medical History:  Past Medical History:   Diagnosis Date    ADHD (attention deficit hyperactivity disorder)     Anxiety     Anxiety state 11/14/2016 8:45:01 AM    [facility] Historical - [facility]: Anxiety-No Additional Notes    Anxiety state 11/14/2016 8:45:01 AM    [facility] - [facility]: Anxiety-No Additional Notes    Calculus of kidney 8/20/2018 11:46:04 AM    [facility] Historical - Unknown: Kidney stones, calcium oxalate-No Additional  Notes    Calculus of kidney 8/20/2018 11:46:04 AM    [facility] - Unknown: Kidney stones, calcium oxalate-No Additional Notes    Depression     History of chlamydia     Infection of kidney 8/20/2018 11:46:21 AM    [facility] - [facility]: Kidney Infection-No Additional Notes    Infection of kidney 8/20/2018 11:46:21 AM    [facility] - [facility]: Kidney Infection-No Additional Notes    Lactose intolerance     Migraine headache     Other and unspecified ovarian cyst 6/27/2016 12:55:46 PM    [facility] - [facility]: Ovarian Cyst-No Additional Notes    Other and unspecified ovarian cyst 6/27/2016 12:55:46 PM    [facility] - [facility]: Ovarian Cyst-No Additional Notes    Other depressive disorder 11/14/2016 8:45:02 AM    [facility] - [facility]: Depression-No Additional Notes    Other depressive disorder 11/14/2016 8:45:02 AM    [facility] - [facility]: Depression-No Additional Notes    Renal disorder     kidney stones    TIA (transient ischemic attack)     Unspecified chlamydial infection, in conditions classified elsewhere and of unspecified site 9/28/2017 3:59:08 PM    [facility] - [facility]: Chlamydia-No Additional Notes    Unspecified chlamydial infection, in conditions classified elsewhere and of unspecified site 9/28/2017 3:59:08 PM    [facility] - [facility]: Chlamydia-No Additional Notes    Urinary tract infection 8/20/2018 11:46:40 AM    [facility] - Urology: Urinary Tract Infection-No Additional Notes    Urinary tract infection 8/20/2018 11:46:40 AM    [facility] - Urology: Urinary Tract Infection-No Additional Notes       Past Surgical History:  Past Surgical History:   Procedure Laterality Date    blood vessel removed from chest      BREAST SURGERY  03/22/2019    kidney stent      LIPOSUCTION      Mirena      placed 2 years ago         Family History:  Family History   Problem Relation Name Age of Onset     Breast cancer Paternal Grandmother      Irritable bowel syndrome Mother      Breast cancer Paternal Aunt x2     Colon cancer Neg Hx      Ovarian cancer Neg Hx         Social History:  Social History     Tobacco Use    Smoking status: Some Days     Current packs/day: 0.50     Types: Vaping with nicotine, Cigarettes    Smokeless tobacco: Current   Substance and Sexual Activity    Alcohol use: No    Drug use: No    Sexual activity: Yes     Partners: Male     Birth control/protection: None        Review of Systems     Review of Systems   Constitutional:  Negative for chills and fever.   HENT:  Negative for congestion.    Respiratory:  Negative for cough and shortness of breath.    Cardiovascular:  Negative for chest pain.   Gastrointestinal:  Positive for abdominal pain (LLQ) and nausea. Negative for vomiting.   Genitourinary:  Negative for dysuria and flank pain.   Musculoskeletal:  Negative for back pain.   Skin:  Negative for rash.   Neurological:  Positive for dizziness. Negative for weakness.   Hematological:  Does not bruise/bleed easily.   All other systems reviewed and are negative.       Physical Exam     Initial Vitals [10/30/24 0826]   BP Pulse Resp Temp SpO2   90/68 89 20 97.8 °F (36.6 °C) 99 %      MAP       --          Physical Exam  Nursing Notes and Vital Signs Reviewed.  Constitutional: Patient is in moderate distress. Well-developed and well-nourished.  Head: Atraumatic. Normocephalic.  Eyes: PERRL. EOM intact. Conjunctivae are not pale. No scleral icterus.  ENT: Mucous membranes are moist. Oropharynx is clear and symmetric.    Neck: Supple. Full ROM. No lymphadenopathy.  Cardiovascular: Regular rate. Regular rhythm. No murmurs, rubs, or gallops. Distal pulses are 2+ and symmetric.  Pulmonary/Chest: No respiratory distress. Clear to auscultation bilaterally. No wheezing or rales.  Abdominal: Soft and non-distended.  There is LLQ tenderness to palpation.  No rebound, guarding, or rigidity. Good bowel  "sounds.  Genitourinary: No CVA tenderness  Musculoskeletal: Moves all extremities. No obvious deformities. No edema. No calf tenderness.  Skin: Warm and dry.  Neurological:  Alert, awake, and appropriate.  Normal speech.  No acute focal neurological deficits are appreciated.  Psychiatric: Normal affect. Good eye contact. Appropriate in content.     ED Course   Procedures  ED Vital Signs:  Vitals:    10/30/24 0826 10/30/24 0904 10/30/24 0921 10/30/24 1041   BP: 90/68 94/62 96/62 (!) 83/51   Pulse: 89 76 63 (!) 57   Resp: 20 19    Temp: 97.8 °F (36.6 °C)      TempSrc: Oral      SpO2: 99% 100% 96% 100%   Weight: 50.7 kg (111 lb 12.4 oz)      Height: 5' 1" (1.549 m)       10/30/24 1102   BP: (!) 95/58   Pulse:    Resp:    Temp:    TempSrc:    SpO2:    Weight:    Height:        Abnormal Lab Results:  Labs Reviewed   COMPREHENSIVE METABOLIC PANEL - Abnormal       Result Value    Sodium 138      Potassium 3.7      Chloride 108      CO2 23      Glucose 87      BUN 15      Creatinine 0.9      Calcium 8.9      Total Protein 7.1      Albumin 3.8      Total Bilirubin 0.4      Alkaline Phosphatase 69      AST 12      ALT 10      eGFR >60      Anion Gap 7 (*)    CBC W/ AUTO DIFFERENTIAL    WBC 8.98      RBC 4.40      Hemoglobin 12.9      Hematocrit 38.2      MCV 87      MCH 29.3      MCHC 33.8      RDW 12.0      Platelets 318      MPV 10.8      Immature Granulocytes 0.2      Gran # (ANC) 6.4      Immature Grans (Abs) 0.02      Lymph # 1.7      Mono # 0.7      Eos # 0.1      Baso # 0.03      nRBC 0      Gran % 71.4      Lymph % 18.8      Mono % 8.0      Eosinophil % 1.3      Basophil % 0.3      Differential Method Automated     URINALYSIS, REFLEX TO URINE CULTURE    Specimen UA Urine, Clean Catch      Color, UA Yellow      Appearance, UA Clear      pH, UA 7.0      Specific Gravity, UA 1.010      Protein, UA Negative      Glucose, UA Negative      Ketones, UA Negative      Bilirubin (UA) Negative      Occult Blood UA Negative      " Nitrite, UA Negative      Urobilinogen, UA Negative      Leukocytes, UA Negative      Narrative:     Specimen Source->Urine   HCG, QUANTITATIVE    HCG Quant 13          All Lab Results:  Results for orders placed or performed during the hospital encounter of 10/30/24   CBC Auto Differential    Collection Time: 10/30/24  8:53 AM   Result Value Ref Range    WBC 8.98 3.90 - 12.70 K/uL    RBC 4.40 4.00 - 5.40 M/uL    Hemoglobin 12.9 12.0 - 16.0 g/dL    Hematocrit 38.2 37.0 - 48.5 %    MCV 87 82 - 98 fL    MCH 29.3 27.0 - 31.0 pg    MCHC 33.8 32.0 - 36.0 g/dL    RDW 12.0 11.5 - 14.5 %    Platelets 318 150 - 450 K/uL    MPV 10.8 9.2 - 12.9 fL    Immature Granulocytes 0.2 0.0 - 0.5 %    Gran # (ANC) 6.4 1.8 - 7.7 K/uL    Immature Grans (Abs) 0.02 0.00 - 0.04 K/uL    Lymph # 1.7 1.0 - 4.8 K/uL    Mono # 0.7 0.3 - 1.0 K/uL    Eos # 0.1 0.0 - 0.5 K/uL    Baso # 0.03 0.00 - 0.20 K/uL    nRBC 0 0 /100 WBC    Gran % 71.4 38.0 - 73.0 %    Lymph % 18.8 18.0 - 48.0 %    Mono % 8.0 4.0 - 15.0 %    Eosinophil % 1.3 0.0 - 8.0 %    Basophil % 0.3 0.0 - 1.9 %    Differential Method Automated    Comprehensive Metabolic Panel    Collection Time: 10/30/24  8:53 AM   Result Value Ref Range    Sodium 138 136 - 145 mmol/L    Potassium 3.7 3.5 - 5.1 mmol/L    Chloride 108 95 - 110 mmol/L    CO2 23 23 - 29 mmol/L    Glucose 87 70 - 110 mg/dL    BUN 15 6 - 20 mg/dL    Creatinine 0.9 0.5 - 1.4 mg/dL    Calcium 8.9 8.7 - 10.5 mg/dL    Total Protein 7.1 6.0 - 8.4 g/dL    Albumin 3.8 3.5 - 5.2 g/dL    Total Bilirubin 0.4 0.1 - 1.0 mg/dL    Alkaline Phosphatase 69 40 - 150 U/L    AST 12 10 - 40 U/L    ALT 10 10 - 44 U/L    eGFR >60 >60 mL/min/1.73 m^2    Anion Gap 7 (L) 8 - 16 mmol/L   HCG, Quantitative    Collection Time: 10/30/24  8:53 AM   Result Value Ref Range    HCG Quant 13 See Text mIU/mL   Urinalysis, Reflex to Urine Culture Urine, Clean Catch    Collection Time: 10/30/24 10:55 AM    Specimen: Urine   Result Value Ref Range    Specimen UA  Urine, Clean Catch     Color, UA Yellow Yellow, Straw, P[place]    Appearance, UA Clear Clear    pH, UA 7.0 5.0 - 8.0    Specific Gravity, UA 1.010 1.005 - 1.030    Protein, UA Negative Negative    Glucose, UA Negative Negative    Ketones, UA Negative Negative    Bilirubin (UA) Negative Negative    Occult Blood UA Negative Negative    Nitrite, UA Negative Negative    Urobilinogen, UA Negative <2.0 EU/dL    Leukocytes, UA Negative Negative         Imaging Results:  Imaging Results              CT Renal Stone Study ABD Pelvis WO (Final result)  Result time 10/30/24 11:34:23      Final result by Jennie BlevinsHair), MD (10/30/24 11:34:23)                   Impression:      No acute intra-abdominal findings.  Persistent small area of focal increased density left costophrenic angle could represent small low-grade infiltrate.    All CT scans at this facility use dose modulation, iterative reconstructions, and/or weight base dosing when appropriate to reduce radiation dose to as low as reasonably achievable      Electronically signed by: Jennie Blevins MD  Date:    10/30/2024  Time:    11:34               Narrative:    EXAMINATION:  CT RENAL STONE STUDY ABD PELVIS WO    CLINICAL HISTORY:  Flank pain, kidney stone suspected;    TECHNIQUE:  Noncontrast images were obtained.    COMPARISON:  CT abdomen pelvis, 10/16/2024.    FINDINGS:  Persistent focal increased density at the left costophrenic angle which could represent a small low-grade infiltrate.  Right base is clear.    The liver, the spleen, and the pancreas appear normal.    The gallbladder is unremarkable.  There is no bile duct dilatation.    The kidneys are normal.  No stones or hydronephrosis.    The aorta and inferior vena cava are unremarkable.    There are no acute bowel abnormalities.   Normal appendix.    Bladder is normal. No abnormal masses or fluid collections in the pelvis.    Skeletal structures are intact.  No acute skeletal findings.                                        US Pelvis Complete Non OB (Final result)  Result time 10/30/24 09:49:01      Final result by Jennie Blevins MD (Timothy) (10/30/24 09:49:01)                   Impression:      Normal study.      Electronically signed by: Jennie Blevins MD  Date:    10/30/2024  Time:    09:49               Narrative:    EXAMINATION:  US PELVIS COMPLETE NON OB    CLINICAL HISTORY:  pelvic pain;    COMPARISON:  CT abdomen pelvis 10/16/2024    FINDINGS:  The uterus appears normal.  it measures 8.1 cm in length.  The endometrium measures 10 mm in thickness.    The right ovary measures 2.7 x 2 x 1.9 cm. The left ovary measures 2.8 x 3.5 x 2.7 cm.  Ovaries appear normal bilaterally.  Vascularity is present bilaterally.    No abnormal masses or fluid collections.                                                The Emergency Provider reviewed the vital signs and test results, which are outlined above.     ED Discussion       12:10 PM: Reassessed pt at this time.  Discussed with pt all pertinent ED information and results. Discussed pt dx and plan of tx. Gave pt all f/u and return to the ED instructions. All questions and concerns were addressed at this time. Pt expresses understanding of information and instructions, and is comfortable with plan to discharge. Pt is stable for discharge.    I discussed with patient and/or family/caretaker that evaluation in the ED does not suggest any emergent or life threatening medical conditions requiring immediate intervention beyond what was provided in the ED, and I believe patient is safe for discharge.  Regardless, an unremarkable evaluation in the ED does not preclude the development or presence of a serious of life threatening condition. As such, patient was instructed to return immediately for any worsening or change in current symptoms.         Medical Decision Making  DDx: ovarian cyst, torsion    Amount and/or Complexity of Data Reviewed  Labs: ordered. Decision-making  details documented in ED Course.  Radiology: ordered. Decision-making details documented in ED Course.     Details: Us negative, ct negative  Discussion of management or test interpretation with external provider(s): Recent D&C after miscarriage, now with LLQ pelvic pain. BHCG trending down appropriately. US negative for torsion. CT negative for other acute conditions.  Will have follow up with OB/GYN    Risk  Prescription drug management.                ED Medication(s):  Medications   morphine injection 4 mg (4 mg Intravenous Not Given 10/30/24 0845)   ketorolac injection 15 mg (15 mg Intravenous Not Given 10/30/24 0915)   HYDROcodone-acetaminophen 5-325 mg per tablet 1 tablet (has no administration in time range)   promethazine (PHENERGAN) 12.5 mg in 0.9% NaCl 50 mL IVPB (0 mg Intravenous Stopped 10/30/24 0915)   sodium chloride 0.9% bolus 1,000 mL 1,000 mL (1,000 mLs Intravenous New Bag 10/30/24 1049)       New Prescriptions    HYDROCODONE-ACETAMINOPHEN (NORCO) 5-325 MG PER TABLET    Take 1 tablet by mouth every 4 (four) hours as needed.        Follow-up Information       [facility] In 2 days.    Contact information:  [address] [address] [phone]                                 Scribe Attestation:   Scribe #1: I performed the above scribed service and the documentation accurately describes the services I performed. I attest to the accuracy of the note.     Attending:   Physician Attestation Statement for Scribe #1: I, Edmond Vaughan MD, personally performed the services described in this documentation, as scribed by Bill Nolen, in my presence, and it is both accurate and complete.           Clinical Impression       ICD-10-CM ICD-9-CM   1. Pelvic pain  R10.2 [id number]       Disposition:   Disposition: Discharged  Condition: Stable        Edmond Vaughan MD  10/30/24 4514

## 2024-10-30 NOTE — ED NOTES
Pt BP to low to get morphine. Cap popped on the morphine. MD Vaughan and Charge nurse Landen notified. 4 mg Morphine wasted in Pyxis with ALVINA Valdez

## 2024-11-08 ENCOUNTER — LAB VISIT (OUTPATIENT)
Dept: LAB | Facility: HOSPITAL | Age: 24
End: 2024-11-08
Attending: ADVANCED PRACTICE MIDWIFE
Payer: MEDICAID

## 2024-11-08 ENCOUNTER — OFFICE VISIT (OUTPATIENT)
Dept: OBSTETRICS AND GYNECOLOGY | Facility: CLINIC | Age: 24
End: 2024-11-08
Payer: MEDICAID

## 2024-11-08 VITALS
WEIGHT: 112.63 LBS | HEIGHT: 61 IN | SYSTOLIC BLOOD PRESSURE: 102 MMHG | DIASTOLIC BLOOD PRESSURE: 62 MMHG | BODY MASS INDEX: 21.27 KG/M2

## 2024-11-08 DIAGNOSIS — O03.9 SAB (SPONTANEOUS ABORTION): ICD-10-CM

## 2024-11-08 DIAGNOSIS — O03.9 SAB (SPONTANEOUS ABORTION): Primary | ICD-10-CM

## 2024-11-08 LAB
ERYTHROCYTE [DISTWIDTH] IN BLOOD BY AUTOMATED COUNT: 12.2 % (ref 11.5–14.5)
HCG INTACT+B SERPL-ACNC: 2.1 MIU/ML
HCT VFR BLD AUTO: 42.7 % (ref 37–48.5)
HGB BLD-MCNC: 14.1 G/DL (ref 12–16)
MCH RBC QN AUTO: 29.4 PG (ref 27–31)
MCHC RBC AUTO-ENTMCNC: 33 G/DL (ref 32–36)
MCV RBC AUTO: 89 FL (ref 82–98)
PLATELET # BLD AUTO: 295 K/UL (ref 150–450)
PMV BLD AUTO: 10.8 FL (ref 9.2–12.9)
RBC # BLD AUTO: 4.8 M/UL (ref 4–5.4)
WBC # BLD AUTO: 6.19 K/UL (ref 3.9–12.7)

## 2024-11-08 PROCEDURE — 99213 OFFICE O/P EST LOW 20 MIN: CPT | Mod: PBBFAC,TH | Performed by: ADVANCED PRACTICE MIDWIFE

## 2024-11-08 PROCEDURE — 36415 COLL VENOUS BLD VENIPUNCTURE: CPT | Performed by: ADVANCED PRACTICE MIDWIFE

## 2024-11-08 PROCEDURE — 85027 COMPLETE CBC AUTOMATED: CPT | Performed by: ADVANCED PRACTICE MIDWIFE

## 2024-11-08 PROCEDURE — 84702 CHORIONIC GONADOTROPIN TEST: CPT | Performed by: ADVANCED PRACTICE MIDWIFE

## 2024-11-08 PROCEDURE — 99999 PR PBB SHADOW E&M-EST. PATIENT-LVL III: CPT | Mod: PBBFAC,,, | Performed by: ADVANCED PRACTICE MIDWIFE

## 2024-11-08 RX ORDER — ONDANSETRON 8 MG/1
8 TABLET, ORALLY DISINTEGRATING ORAL 2 TIMES DAILY
Qty: 30 TABLET | Refills: 3 | Status: SHIPPED | OUTPATIENT
Start: 2024-11-08

## 2024-11-08 RX ORDER — CYCLOBENZAPRINE HCL 10 MG
10 TABLET ORAL 3 TIMES DAILY PRN
Qty: 15 TABLET | Refills: 0 | Status: SHIPPED | OUTPATIENT
Start: 2024-11-08 | End: 2024-11-18

## 2024-11-08 RX ORDER — ACETAMINOPHEN AND CODEINE PHOSPHATE 300; 30 MG/1; MG/1
1 TABLET ORAL
COMMUNITY
Start: 2024-09-06

## 2024-11-08 NOTE — PROGRESS NOTES
Ruslan Perez  complains of intense cramping and nausea that started this am when she started bleeding. She had a D&C performed in [place] 1 month ago, and this is her first episode of bleeding since. She is wearing the same pad that she put on this morning, and has not noticed any passing of clots. She denies abdominal tenderness/fever/chills/body aches She needs a letter to give to her  stating that she has been experiencing a miscarriage and has not been able to work.    Past Medical History:   Diagnosis Date    ADHD (attention deficit hyperactivity disorder)     Anxiety     Anxiety state 2016 8:45:01 AM    [facility] Historical - [facility]: Anxiety-No Additional Notes    Anxiety state 2016 8:45:01 AM    [facility] - [facility]: Anxiety-No Additional Notes    Calculus of kidney 2018 11:46:04 AM    [facility] - Unknown: Kidney stones, calcium oxalate-No Additional Notes    Calculus of kidney 2018 11:46:04 AM    [facility] - Unknown: Kidney stones, calcium oxalate-No Additional Notes    Depression     History of chlamydia     Infection of kidney 2018 11:46:21 AM    [facility] - [facility]: Kidney Infection-No Additional Notes    Infection of kidney 2018 11:46:21 AM    [facility] - [facility]: Kidney Infection-No Additional Notes    Lactose intolerance     Migraine headache     Other and unspecified ovarian cyst 2016 12:55:46 PM    [facility] - [facility]: Ovarian Cyst-No Additional Notes    Other and unspecified ovarian cyst 2016 12:55:46 PM    [facility] - [facility]: Ovarian Cyst-No Additional Notes    Other depressive disorder 2016 8:45:02 AM    [facility] - [facility]: Depression-No Additional Notes    Other depressive disorder 2016 8:45:02 AM    [facility] - [facility]: Depression-No Additional Notes    Renal disorder     kidney stones    TIA (transient ischemic  attack)     Unspecified chlamydial infection, in conditions classified elsewhere and of unspecified site 9/28/2017 3:59:08 PM    [facility] Historical - [facility]: Chlamydia-No Additional Notes    Unspecified chlamydial infection, in conditions classified elsewhere and of unspecified site 9/28/2017 3:59:08 PM    [facility] - [facility]: Chlamydia-No Additional Notes    Urinary tract infection 8/20/2018 11:46:40 AM    [facility] Historical - Urology: Urinary Tract Infection-No Additional Notes    Urinary tract infection 8/20/2018 11:46:40 AM    [facility] Historical - Urology: Urinary Tract Infection-No Additional Notes     Past Surgical History:   Procedure Laterality Date    blood vessel removed from chest      BREAST SURGERY  03/22/2019    kidney stent      LIPOSUCTION      Mirena      placed 2 years ago     Family History   Problem Relation Name Age of Onset    Breast cancer Paternal Grandmother      Irritable bowel syndrome Mother      Breast cancer Paternal Aunt x2     Colon cancer Neg Hx      Ovarian cancer Neg Hx       Review of patient's allergies indicates:   Allergen Reactions    Fluconazole Anaphylaxis and Hives    Latex Itching and Other (See Comments)     Redness    Prednisone Palpitations     Social History     Socioeconomic History    Marital status: Single   Tobacco Use    Smoking status: Some Days     Current packs/day: 0.50     Types: Vaping with nicotine, Cigarettes    Smokeless tobacco: Current   Substance and Sexual Activity    Alcohol use: No    Drug use: No    Sexual activity: Yes     Partners: Male     Birth control/protection: None   Social History Narrative    ** Merged History Encounter **          Social Drivers of Health     Financial Resource Strain: Low Risk  (11/8/2023)    Received from [facility], [facility]    Overall Financial  Resource Strain (CARDIA)     Difficulty of Paying Living Expenses: Not hard at all   Food Insecurity: No Food Insecurity (11/8/2023)    Received from [facility], [facility]    Hunger Vital Sign     Worried About Running Out of Food in the Last Year: Never true     Ran Out of Food in the Last Year: Never true   Transportation Needs: No Transportation Needs (11/8/2023)    [facility], [facility]    PRAPARE - Transportation     Lack of Transportation (Medical): No     Lack of Transportation (Non-Medical): No   Physical Activity: Inactive (11/8/2023)    [facility], [facility]    Exercise Vital Sign     Days of Exercise per Week: 0 days     Minutes of Exercise per Session: 0 min   Stress: Stress Concern Present (11/8/2023)    [facility], [facility]    [employer] - Occupational Stress Questionnaire     Feeling of Stress : Very much   Housing Stability: Unknown (11/8/2023)    [facility], [facility]    Housing Stability Vital Sign     Unable to Pay for Housing in the Last Year: No     Unstable Housing in the Last Year: No       ROS:  GENERAL: No fever, chills, fatigability or weight loss.  VULVAR: No  pain, no lesions and no itching.  VAGINAL: No relaxation, no itching, no discharge,   ABDOMEN: No abdominal pain. Denies nausea. Denies vomiting. No diarrhea. No constipation  BREAST: Denies pain. No lumps. No discharge.  URINARY: No incontinence, no nocturia, no frequency and no dysuria.  CARDIOVASCULAR: No chest pain. No shortness of breath. No leg cramps.  NEUROLOGICAL: no headaches. No vision changes.      Vitals:    24 1133   BP: 102/62     GENERAL: healthy, alert, mild distress        Ruslan was seen today for threatened miscarriage.    Diagnoses and all orders for this visit:    SAB (spontaneous )  -     CBC Without Differential; Future  -     HCG, QUANTITATIVE, PREGNANCY; Future  -     ondansetron (ZOFRAN-ODT) 8 MG TbDL; Take 1 tablet (8 mg total) by mouth 2 (two) times daily.  -     cyclobenzaprine (FLEXERIL) 10 MG tablet; Take 1 tablet (10 mg total) by mouth 3 (three) times daily as needed for Muscle spasms.        Repeat beta hcg and CBC today. Encouraged rest/hydration and warm soaks. May use heating pad and OTC motrin.

## 2024-11-25 ENCOUNTER — PATIENT MESSAGE (OUTPATIENT)
Dept: OBSTETRICS AND GYNECOLOGY | Facility: CLINIC | Age: 24
End: 2024-11-25
Payer: MEDICAID

## 2025-02-20 ENCOUNTER — HOSPITAL ENCOUNTER (EMERGENCY)
Facility: HOSPITAL | Age: 25
Discharge: HOME | End: 2025-02-20
Attending: EMERGENCY MEDICINE
Payer: MEDICAID

## 2025-02-20 VITALS
HEART RATE: 96 BPM | WEIGHT: 110 LBS | SYSTOLIC BLOOD PRESSURE: 118 MMHG | OXYGEN SATURATION: 99 % | RESPIRATION RATE: 18 BRPM | TEMPERATURE: 98 F | DIASTOLIC BLOOD PRESSURE: 90 MMHG | BODY MASS INDEX: 20.78 KG/M2

## 2025-02-20 DIAGNOSIS — S61.309A NAIL AVULSION, FINGER, INITIAL ENCOUNTER: Primary | ICD-10-CM

## 2025-02-20 PROCEDURE — 99284 EMERGENCY DEPT VISIT MOD MDM: CPT | Mod: 25

## 2025-02-20 PROCEDURE — 11730 AVULSION NAIL PLATE SIMPLE 1: CPT | Mod: F5

## 2025-02-20 PROCEDURE — 63600175 PHARM REV CODE 636 W HCPCS: Performed by: REGISTERED NURSE

## 2025-02-20 PROCEDURE — 96372 THER/PROPH/DIAG INJ SC/IM: CPT | Performed by: REGISTERED NURSE

## 2025-02-20 PROCEDURE — 25000003 PHARM REV CODE 250: Performed by: REGISTERED NURSE

## 2025-02-20 RX ORDER — MORPHINE SULFATE 4 MG/ML
2 INJECTION, SOLUTION INTRAMUSCULAR; INTRAVENOUS
Status: DISCONTINUED | OUTPATIENT
Start: 2025-02-20 | End: 2025-02-20 | Stop reason: HOSPADM

## 2025-02-20 RX ORDER — HYDROCODONE BITARTRATE AND ACETAMINOPHEN 5; 325 MG/1; MG/1
1 TABLET ORAL EVERY 6 HOURS PRN
Qty: 12 TABLET | Refills: 0 | Status: SHIPPED | OUTPATIENT
Start: 2025-02-20

## 2025-02-20 RX ORDER — HYDROCODONE BITARTRATE AND ACETAMINOPHEN 5; 325 MG/1; MG/1
1 TABLET ORAL
Refills: 0 | Status: COMPLETED | OUTPATIENT
Start: 2025-02-20 | End: 2025-02-20

## 2025-02-20 RX ORDER — LORAZEPAM 2 MG/ML
1 INJECTION INTRAMUSCULAR
Status: COMPLETED | OUTPATIENT
Start: 2025-02-20 | End: 2025-02-20

## 2025-02-20 RX ORDER — LIDOCAINE HYDROCHLORIDE 10 MG/ML
10 INJECTION, SOLUTION EPIDURAL; INFILTRATION; INTRACAUDAL; PERINEURAL
Status: COMPLETED | OUTPATIENT
Start: 2025-02-20 | End: 2025-02-20

## 2025-02-20 RX ORDER — ONDANSETRON HYDROCHLORIDE 2 MG/ML
4 INJECTION, SOLUTION INTRAVENOUS
Status: COMPLETED | OUTPATIENT
Start: 2025-02-20 | End: 2025-02-20

## 2025-02-20 RX ORDER — MORPHINE SULFATE 4 MG/ML
2 INJECTION, SOLUTION INTRAMUSCULAR; INTRAVENOUS
Status: COMPLETED | OUTPATIENT
Start: 2025-02-20 | End: 2025-02-20

## 2025-02-20 RX ADMIN — MORPHINE SULFATE 2 MG: 4 INJECTION INTRAVENOUS at 12:02

## 2025-02-20 RX ADMIN — LORAZEPAM 1 MG: 2 INJECTION INTRAMUSCULAR; INTRAVENOUS at 12:02

## 2025-02-20 RX ADMIN — LIDOCAINE HYDROCHLORIDE 100 MG: 10 INJECTION, SOLUTION EPIDURAL; INFILTRATION; INTRACAUDAL at 10:02

## 2025-02-20 RX ADMIN — Medication: at 11:02

## 2025-02-20 RX ADMIN — BACITRACIN ZINC, NEOMYCIN, POLYMYXIN B 1 EACH: 400; 3.5; 5 OINTMENT TOPICAL at 11:02

## 2025-02-20 RX ADMIN — ONDANSETRON 4 MG: 2 INJECTION INTRAMUSCULAR; INTRAVENOUS at 12:02

## 2025-02-20 RX ADMIN — HYDROCODONE BITARTRATE AND ACETAMINOPHEN 1 TABLET: 5; 325 TABLET ORAL at 10:02

## 2025-02-20 NOTE — ED PROVIDER NOTES
Encounter Date: 2/20/2025       History     Chief Complaint   Patient presents with    Finger Injury     Pt reports accidentally pulling nail back on thumb on right hand with bleeding present. Bleeding controlled at this time     24-year-old female presents emergency department with complaints of finger pain.  Patient states she bent her nail back while cleaning her house today.  Patient has long artificial nails.  Associated symptoms include bleeding from the finger.  Patient denies any other injuries or symptoms.    The history is provided by the patient.     Review of patient's allergies indicates:   Allergen Reactions    Fluconazole Anaphylaxis and Hives    Latex Itching and Other (See Comments)     Redness    Prednisone Palpitations     Past Medical History:   Diagnosis Date    ADHD (attention deficit hyperactivity disorder)     Anxiety     Anxiety state 11/14/2016 8:45:01 AM    [facility] - [place]: Anxiety-No Additional Notes    Anxiety state 11/14/2016 8:45:01 AM    [facility] - [place]: Anxiety-No Additional Notes    Calculus of kidney 8/20/2018 11:46:04 AM    [facility] - Unknown: Kidney stones, calcium oxalate-No Additional Notes    Calculus of kidney 8/20/2018 11:46:04 AM    [facility] - Unknown: Kidney stones, calcium oxalate-No Additional Notes    Depression     History of chlamydia     Infection of kidney 8/20/2018 11:46:21 AM    [facility] - [place]: Kidney Infection-No Additional Notes    Infection of kidney 8/20/2018 11:46:21 AM    [facility] - [place]: Kidney Infection-No Additional Notes    Lactose intolerance     Migraine headache     Other and unspecified ovarian cyst 6/27/2016 12:55:46 PM    [facility] - [place]: Ovarian Cyst-No Additional Notes    Other and unspecified ovarian cyst 6/27/2016 12:55:46 PM    [facility] - [facility]: Ovarian Cyst-No Additional Notes    Other depressive disorder 11/14/2016 8:45:02  AM    [facility] - HA: Depression-No Additional Notes    Other depressive disorder 11/14/2016 8:45:02 AM    [facility] - [facility]: Depression-No Additional Notes    Renal disorder     kidney stones    TIA (transient ischemic attack)     Unspecified chlamydial infection, in conditions classified elsewhere and of unspecified site 9/28/2017 3:59:08 PM    [facility] - [facility]: Chlamydia-No Additional Notes    Unspecified chlamydial infection, in conditions classified elsewhere and of unspecified site 9/28/2017 3:59:08 PM    [facility] - [place]: Chlamydia-No Additional Notes    Urinary tract infection 8/20/2018 11:46:40 AM    [facility] - Urology: Urinary Tract Infection-No Additional Notes    Urinary tract infection 8/20/2018 11:46:40 AM    [facility] - Urology: Urinary Tract Infection-No Additional Notes     Past Surgical History:   Procedure Laterality Date    blood vessel removed from chest      BREAST SURGERY  03/22/2019    kidney stent      LIPOSUCTION      Mirena      placed 2 years ago     Family History   Problem Relation Name Age of Onset    Breast cancer Paternal Grandmother      Irritable bowel syndrome Mother      Breast cancer Paternal Aunt x2     Colon cancer Neg Hx      Ovarian cancer Neg Hx       Social History[1]  Review of Systems   Constitutional:  Negative for fever.   HENT:  Negative for sore throat.    Respiratory:  Negative for shortness of breath.    Cardiovascular:  Negative for chest pain.   Gastrointestinal:  Negative for nausea.   Genitourinary:  Negative for dysuria.   Musculoskeletal:  Negative for back pain.        +right thumb injury   Skin:  Negative for rash.   Neurological:  Negative for weakness.   Hematological:  Does not bruise/bleed easily.   All other systems reviewed and are negative.      Physical Exam     Initial Vitals [02/20/25 1030]   BP Pulse Resp Temp SpO2   (!) 151/63 (!) 122 19 98.1 °F (36.7 °C) 99 %       MAP       --         Physical Exam    Constitutional: She appears well-developed and well-nourished. She is not diaphoretic. No distress.   HENT:   Head: Normocephalic and atraumatic.   Eyes: Conjunctivae and EOM are normal. Pupils are equal, round, and reactive to light.   Neck: Neck supple.   Normal range of motion.  Cardiovascular:  Normal rate, regular rhythm and normal heart sounds.           No murmur heard.  Pulmonary/Chest: Breath sounds normal. No respiratory distress. She has no wheezes. She has no rales.   Abdominal: Abdomen is soft. Bowel sounds are normal. There is no abdominal tenderness. There is no rebound and no guarding.   Musculoskeletal:         General: No edema. Normal range of motion.      Right hand: Tenderness present.      Cervical back: Normal range of motion and neck supple.      Comments: Finger nail avulsion noted to the right thumb, long artificial nail easily lifts off nailbed, bleeding controlled     Neurological: She is alert and oriented to person, place, and time. No cranial nerve deficit. GCS score is 15. GCS eye subscore is 4. GCS verbal subscore is 5. GCS motor subscore is 6.   Skin: Skin is warm and dry. Capillary refill takes less than 2 seconds.   Psychiatric: She has a normal mood and affect. Thought content normal.         ED Course   Nail Removal    Date/Time: 2/20/2025 11:02 AM    Performed by: Anthony Stovall Jr., FNP  Authorized by: Anthony Stovall Jr., FNP  Location: right hand  Location details: right thumb  Anesthesia: digital block    Anesthesia:  Local Anesthetic: lidocaine 1% without epinephrine  Anesthetic total: 8 mL  Preparation: skin prepped with Betadine  Amount removed: complete  Nail bed sutured: no  Removed nail replaced and anchored: no  Dressing: petrolatum-impregnated gauze and antibiotic ointment  Patient tolerance: Patient tolerated the procedure well with no immediate complications        Labs Reviewed - No data to display        Imaging Results              X-Ray Finger 2 or More Views Right (Final result)  Result time 02/20/25 13:08:22      Final result by Manuel Reeves MD (02/20/25 13:08:22)                   Impression:      No definite acute osseous abnormality identified.      Electronically signed by: Manuel Reeves  Date:    02/20/2025  Time:    13:08               Narrative:    EXAMINATION:  XR FINGER 2 OR MORE VIEWS RIGHT    CLINICAL HISTORY:  right thumb injury;    TECHNIQUE:  Two views right thumb radiographs    COMPARISON:  None    FINDINGS:  No fracture.  No traumatic malalignment.  No osseous destructive process.  Soft tissue injury identified.                                       Medications   morphine injection 2 mg (has no administration in time range)   LIDOcaine (PF) 10 mg/ml (1%) injection 100 mg (100 mg Infiltration Given by Provider 2/20/25 1042)   HYDROcodone-acetaminophen 5-325 mg per tablet 1 tablet (1 tablet Oral Given 2/20/25 1041)   neomycin-bacitracnZn-polymyxnB packet 1 each (1 each Topical (Top) Given 2/20/25 1123)   LETS (LIDOcaine-TETRAcaine-EPINEPHrine) gel solution ( Topical (Top) Given 2/20/25 1124)   ondansetron injection 4 mg (4 mg Intramuscular Given 2/20/25 1203)   LORazepam injection 1 mg (1 mg Intramuscular Given 2/20/25 1203)   morphine injection 2 mg (2 mg Intramuscular Given 2/20/25 1207)     Medical Decision Making  Amount and/or Complexity of Data Reviewed  Radiology: ordered.     Details: No acute findings  Discussion of management or test interpretation with external provider(s): Splint added for protection.  Petroleum gauze placed to the fingernail.  Follow up with primary care.    Risk  OTC drugs.  Prescription drug management.                                      Clinical Impression:  Final diagnoses:  [S61.309A] Nail avulsion, finger, initial encounter (Primary)          ED Disposition Condition    Discharge Stable          ED Prescriptions       Medication Sig Dispense Start  Date End Date Auth. Provider    HYDROcodone-acetaminophen (NORCO) 5-325 mg per tablet Take 1 tablet by mouth every 6 (six) hours as needed for Pain. 12 tablet 2/20/2025 -- Anthony Stovall Jr., FNP          Follow-up Information       Follow up With Specialties Details Why Contact Info    [facility] - Emergency Dept. Emergency Medicine  If symptoms worsen [id number] [facility] [address] [phone]                 [1]   Social History  Tobacco Use    Smoking status: Some Days     Current packs/day: 0.50     Types: Vaping with nicotine, Cigarettes    Smokeless tobacco: Current   Substance Use Topics    Alcohol use: No    Drug use: No        Anthony Stovall Jr., FNP  02/20/25 0411

## 2025-04-06 ENCOUNTER — HOSPITAL ENCOUNTER (EMERGENCY)
Facility: HOSPITAL | Age: 25
Discharge: HOME | End: 2025-04-06
Attending: EMERGENCY MEDICINE
Payer: MEDICAID

## 2025-04-06 VITALS
WEIGHT: 104.38 LBS | DIASTOLIC BLOOD PRESSURE: 57 MMHG | OXYGEN SATURATION: 100 % | HEIGHT: 59 IN | BODY MASS INDEX: 21.04 KG/M2 | HEART RATE: 63 BPM | TEMPERATURE: 98 F | SYSTOLIC BLOOD PRESSURE: 114 MMHG | RESPIRATION RATE: 20 BRPM

## 2025-04-06 DIAGNOSIS — B34.9 VIRAL SYNDROME: ICD-10-CM

## 2025-04-06 DIAGNOSIS — J04.0 LARYNGITIS: Primary | ICD-10-CM

## 2025-04-06 LAB
ABSOLUTE EOSINOPHIL (OHS): 0.22 K/UL
ABSOLUTE MONOCYTE (OHS): 0.61 K/UL (ref 0.3–1)
ABSOLUTE NEUTROPHIL COUNT (OHS): 4.71 K/UL (ref 1.8–7.7)
ALBUMIN SERPL BCP-MCNC: 3.8 G/DL (ref 3.5–5.2)
ALP SERPL-CCNC: 58 UNIT/L (ref 40–150)
ALT SERPL W/O P-5'-P-CCNC: 18 UNIT/L (ref 10–44)
ANION GAP (OHS): 8 MMOL/L (ref 8–16)
AST SERPL-CCNC: 16 UNIT/L (ref 11–45)
BASOPHILS # BLD AUTO: 0.04 K/UL
BASOPHILS NFR BLD AUTO: 0.5 %
BILIRUB SERPL-MCNC: 0.9 MG/DL (ref 0.1–1)
BUN SERPL-MCNC: 16 MG/DL (ref 6–20)
CALCIUM SERPL-MCNC: 9.1 MG/DL (ref 8.7–10.5)
CHLORIDE SERPL-SCNC: 108 MMOL/L (ref 95–110)
CO2 SERPL-SCNC: 20 MMOL/L (ref 23–29)
CREAT SERPL-MCNC: 0.8 MG/DL (ref 0.5–1.4)
ERYTHROCYTE [DISTWIDTH] IN BLOOD BY AUTOMATED COUNT: 12.3 % (ref 11.5–14.5)
GFR SERPLBLD CREATININE-BSD FMLA CKD-EPI: >60 ML/MIN/1.73/M2
GLUCOSE SERPL-MCNC: 81 MG/DL (ref 70–110)
GROUP A STREP MOLECULAR (OHS): NEGATIVE
HCT VFR BLD AUTO: 35.4 % (ref 37–48.5)
HGB BLD-MCNC: 12.3 GM/DL (ref 12–16)
IMM GRANULOCYTES # BLD AUTO: 0.02 K/UL (ref 0–0.04)
IMM GRANULOCYTES NFR BLD AUTO: 0.3 % (ref 0–0.5)
INFLUENZA A MOLECULAR (OHS): NEGATIVE
INFLUENZA B MOLECULAR (OHS): NEGATIVE
LYMPHOCYTES # BLD AUTO: 1.77 K/UL (ref 1–4.8)
MCH RBC QN AUTO: 29.6 PG (ref 27–31)
MCHC RBC AUTO-ENTMCNC: 34.7 G/DL (ref 32–36)
MCV RBC AUTO: 85 FL (ref 82–98)
NUCLEATED RBC (/100WBC) (OHS): 0 /100 WBC
PLATELET # BLD AUTO: 247 K/UL (ref 150–450)
PMV BLD AUTO: 10.9 FL (ref 9.2–12.9)
POTASSIUM SERPL-SCNC: 3.6 MMOL/L (ref 3.5–5.1)
PROT SERPL-MCNC: 7.2 GM/DL (ref 6–8.4)
RBC # BLD AUTO: 4.15 M/UL (ref 4–5.4)
RELATIVE EOSINOPHIL (OHS): 3 %
RELATIVE LYMPHOCYTE (OHS): 24 % (ref 18–48)
RELATIVE MONOCYTE (OHS): 8.3 % (ref 4–15)
RELATIVE NEUTROPHIL (OHS): 63.9 % (ref 38–73)
SARS-COV-2 RDRP RESP QL NAA+PROBE: NEGATIVE
SODIUM SERPL-SCNC: 136 MMOL/L (ref 136–145)
WBC # BLD AUTO: 7.37 K/UL (ref 3.9–12.7)

## 2025-04-06 PROCEDURE — 25000003 PHARM REV CODE 250: Performed by: EMERGENCY MEDICINE

## 2025-04-06 PROCEDURE — 99284 EMERGENCY DEPT VISIT MOD MDM: CPT | Mod: 25

## 2025-04-06 PROCEDURE — 63600175 PHARM REV CODE 636 W HCPCS: Mod: JZ,TB | Performed by: EMERGENCY MEDICINE

## 2025-04-06 PROCEDURE — U0002 COVID-19 LAB TEST NON-CDC: HCPCS

## 2025-04-06 PROCEDURE — 85025 COMPLETE CBC W/AUTO DIFF WBC: CPT

## 2025-04-06 PROCEDURE — 96374 THER/PROPH/DIAG INJ IV PUSH: CPT

## 2025-04-06 PROCEDURE — 80053 COMPREHEN METABOLIC PANEL: CPT

## 2025-04-06 PROCEDURE — 87651 STREP A DNA AMP PROBE: CPT

## 2025-04-06 PROCEDURE — 87502 INFLUENZA DNA AMP PROBE: CPT

## 2025-04-06 PROCEDURE — 96361 HYDRATE IV INFUSION ADD-ON: CPT

## 2025-04-06 RX ORDER — METHYLPREDNISOLONE SOD SUCC 125 MG
125 VIAL (EA) INJECTION
Status: COMPLETED | OUTPATIENT
Start: 2025-04-06 | End: 2025-04-06

## 2025-04-06 RX ORDER — KETOROLAC TROMETHAMINE 30 MG/ML
15 INJECTION, SOLUTION INTRAMUSCULAR; INTRAVENOUS
Status: DISCONTINUED | OUTPATIENT
Start: 2025-04-06 | End: 2025-04-06 | Stop reason: HOSPADM

## 2025-04-06 RX ADMIN — METHYLPREDNISOLONE SODIUM SUCCINATE 125 MG: 125 INJECTION, POWDER, FOR SOLUTION INTRAMUSCULAR; INTRAVENOUS at 01:04

## 2025-04-06 RX ADMIN — SODIUM CHLORIDE 1000 ML: 9 INJECTION, SOLUTION INTRAVENOUS at 01:04

## 2025-04-06 NOTE — FIRST PROVIDER EVALUATION
"Medical screening examination initiated.  I have conducted a focused provider triage encounter, findings are as follows:    Brief history of present illness:  24-year-old female who presents with complaints of sore throat that has bothered her for roughly 2 weeks. Son recently tested positive for strep. Reports some dizziness and feeling presyncopal as well.  Denies syncope. Denies any shortness of breath or chest pain.      Vitals:    04/06/25 1248   BP: 124/65   BP Location: Right arm   Pulse: 78   Resp: 16   Temp: 97.7 °F (36.5 °C)   TempSrc: Oral   SpO2: 99%   Weight: 47.4 kg (104 lb 6.4 oz)   Height: 4' 11" (1.499 m)       Pertinent physical exam:    Uvula midline. No edema/swelling.  Mild erythema.   AAOX3   Normal work of breathing     Brief workup plan:  Labs and tests as ordered    Preliminary workup initiated; this workup will be continued and followed by the physician or advanced practice provider that is assigned to the patient when roomed.  "

## 2025-04-06 NOTE — ED PROVIDER NOTES
SCRIBE #1 NOTE: I, Maya Brennan, am scribing for, and in the presence of, Beatriz Barrera MD. I have scribed the entire note.       History     Chief Complaint   Patient presents with    Sore Throat     Pt c/o sore throat, fever 100 at home. Pt's son tested positive for strep. C/o cough, body aches, fatigue. Seen at urgent care, tested negative for strep. given amoxicillin, unable to take pills d/t pain.     Review of patient's allergies indicates:   Allergen Reactions    Fluconazole Anaphylaxis and Hives    Metoclopramide hcl     Latex Itching and Other (See Comments)     Redness    Prednisone Palpitations         History of Present Illness     HPI    4/6/2025, 2:29 PM  History obtained from the patient and medical records      History of Present Illness: Ruslan Perez is a 24 y.o. female patient with a PMHx of renal disorder, migraine, TIA, kidney infection, depression, and anxiety who presents to the Emergency Department for evaluation of sore throat which began approximately 2 weeks ago. Pt went to urgent care 2 weeks ago and tested negative for strep. Pt was dx with a viral infection was given a decadron shot and prescribed steroids. 2 days after going to urgent care, pt's son was diagnosed with Strep A. Pt was experiencing symptoms, so she visited her PCP who prescribed her amoxicillin. Pt states that she has been unable to take the amoxicillin because the pills are too big. Symptoms are constant and moderate in severity. No mitigating or exacerbating factors reported. Associated sxs include weakness, intermittent fever, cold sweats, and chills. Patient denies any SOB or CP. Prior Tx includes a decadron shot and flonase.  No further complaints or concerns at this time.       Arrival mode: Personal Transportation    PCP: No, Primary Doctor        Past Medical History:  Past Medical History:   Diagnosis Date    ADHD (attention deficit hyperactivity disorder)     Anxiety     Anxiety state  11/14/2016 8:45:01 AM    [facility] - [place]: Anxiety-No Additional Notes    Anxiety state 11/14/2016 8:45:01 AM    [facility]    Calculus of kidney 8/20/2018 11:46:04 AM    [facility] - Unknown: Kidney stones, calcium oxalate-No Additional Notes    Calculus of kidney 8/20/2018 11:46:04 AM    [facility] - Unknown: Kidney stones, calcium oxalate-No Additional Notes    Depression     History of chlamydia     Infection of kidney 8/20/2018 11:46:21 AM    [facility] - [facility]: Kidney Infection-No Additional Notes    Infection of kidney 8/20/2018 11:46:21 AM    [facility] Infection-No Additional Notes    Lactose intolerance     Migraine headache     Other and unspecified ovarian cyst 6/27/2016 12:55:46 PM    [facility] Ovarian Cyst-No Additional Notes    Other and unspecified ovarian cyst 6/27/2016 12:55:46 PM    [facility] Ovarian Cyst-No Additional Notes    Other depressive disorder 11/14/2016 8:45:02 AM    [facility] - [facility]: Depression-No Additional Notes    Other depressive disorder 11/14/2016 8:45:02 AM    [facility]-No Additional Notes    Renal disorder     kidney stones    TIA (transient ischemic attack)     Unspecified chlamydial infection, in conditions classified elsewhere and of unspecified site 9/28/2017 3:59:08 PM    [facility] Chlamydia-No Additional Notes    Unspecified chlamydial infection, in conditions classified elsewhere and of unspecified site 9/28/2017 3:59:08 PM    [facility] Chlamydia-No Additional Notes    Urinary tract infection 8/20/2018 11:46:40 AM    [facility] - Urology: Urinary Tract Infection-No Additional Notes    Urinary tract infection 8/20/2018 11:46:40 AM    [facility] - Urology: Urinary Tract Infection-No  Additional Notes       Past Surgical History:  Past Surgical History:   Procedure Laterality Date    blood vessel removed from chest      BREAST SURGERY  03/22/2019    kidney stent      LIPOSUCTION      Mirena      placed 2 years ago         Family History:  Family History   Problem Relation Name Age of Onset    Breast cancer Paternal Grandmother      Irritable bowel syndrome Mother      Breast cancer Paternal Aunt x2     Colon cancer Neg Hx      Ovarian cancer Neg Hx         Social History:  Social History     Tobacco Use    Smoking status: Some Days     Current packs/day: 0.50     Types: Vaping with nicotine, Cigarettes    Smokeless tobacco: Current   Substance and Sexual Activity    Alcohol use: No    Drug use: No    Sexual activity: Yes     Partners: Male     Birth control/protection: None        Review of Systems     Review of Systems   Constitutional:  Positive for chills, diaphoresis (cold sweats) and fever (intermittent).   HENT:  Positive for sore throat.    Respiratory:  Negative for shortness of breath.    Cardiovascular:  Negative for chest pain.   Gastrointestinal:  Negative for nausea.   Genitourinary:  Negative for dysuria.   Musculoskeletal:  Negative for back pain.   Skin:  Negative for rash.   Neurological:  Positive for weakness.   Hematological:  Does not bruise/bleed easily.      Physical Exam     Initial Vitals [04/06/25 1248]   BP Pulse Resp Temp SpO2   124/65 78 16 97.7 °F (36.5 °C) 99 %      MAP       --          Physical Exam  Nursing Notes and Vital Signs Reviewed.  Constitutional: Patient is in no acute distress. Well-developed and well-nourished.  Head: Atraumatic. Normocephalic.  Eyes: PERRL. EOM intact. Conjunctivae are not pale. No scleral icterus.  ENT: Mucous membranes are moist. Oropharynx is clear and symmetric. Voice is hoarse. No erythema or tonsillar exudates.   Neck: Supple. Full ROM. No lymphadenopathy.  Cardiovascular: Regular rate. Regular rhythm. No murmurs, rubs, or  "gallops. Distal pulses are 2+ and symmetric.  Pulmonary/Chest: No respiratory distress. Clear to auscultation bilaterally. No wheezing or rales.  Abdominal: Soft and non-distended.  There is no tenderness.  No rebound, guarding, or rigidity. Good bowel sounds.  Genitourinary: No CVA tenderness.  Musculoskeletal: Moves all extremities. No obvious deformities. No edema. No calf tenderness.  Skin: Warm and dry.  Neurological:  Alert, awake, and appropriate.  Normal speech.  No acute focal neurological deficits are appreciated.  Psychiatric: Normal affect. Good eye contact. Appropriate in content.     ED Course   Procedures  ED Vital Signs:  Vitals:    04/06/25 1248 04/06/25 1330 04/06/25 1400 04/06/25 1430   BP: 124/65 (!) 96/59 102/65 (!) 114/57   Pulse: 78 64 68 63   Resp: 16 (!) 21 18 20   Temp: 97.7 °F (36.5 °C)      TempSrc: Oral      SpO2: 99% 100% 100% 100%   Weight: 47.4 kg (104 lb 6.4 oz)      Height: 4' 11" (1.499 m)          Abnormal Lab Results:  Labs Reviewed   COMPREHENSIVE METABOLIC PANEL - Abnormal       Result Value    Sodium 136      Potassium 3.6      Chloride 108      CO2 20 (*)     Glucose 81      BUN 16      Creatinine 0.8      Calcium 9.1      Protein Total 7.2      Albumin 3.8      Bilirubin Total 0.9      ALP 58      AST 16      ALT 18      Anion Gap 8      eGFR >60     CBC WITH DIFFERENTIAL - Abnormal    WBC 7.37      RBC 4.15      HGB 12.3      HCT 35.4 (*)     MCV 85      MCH 29.6      MCHC 34.7      RDW 12.3      Platelet Count 247      MPV 10.9      Nucleated RBC 0      Neut % 63.9      Lymph % 24.0      Mono % 8.3      Eos % 3.0      Basophil % 0.5      Imm Grans % 0.3      Neut # 4.71      Lymph # 1.77      Mono # 0.61      Eos # 0.22      Baso # 0.04      Imm Grans # 0.02     INFLUENZA A & B BY MOLECULAR - Normal    INFLUENZA A MOLECULAR Negative      INFLUENZA B MOLECULAR  Negative     GROUP A STREP, MOLECULAR - Normal    Group A Strep Molecular Negative      Narrative:     " Arcanobacterium haemolyticum and Beta Streptococcus group C and G will not be detected by this test method.  Please order Throat Culture ([id number]) if suspected.       SARS-COV-2 RNA AMPLIFICATION, QUAL - Normal    SARS COV-2 Molecular Negative     CBC W/ AUTO DIFFERENTIAL    Narrative:     The following orders were created for panel order CBC auto differential.  Procedure                               Abnormality         Status                     ---------                               -----------         ------                     CBC with Differential[[id number]]       Abnormal            Final result                 Please view results for these tests on the individual orders.        All Lab Results:  Results for orders placed or performed during the hospital encounter of 04/06/25   Comprehensive metabolic panel    Collection Time: 04/06/25  1:31 PM   Result Value Ref Range    Sodium 136 136 - 145 mmol/L    Potassium 3.6 3.5 - 5.1 mmol/L    Chloride 108 95 - 110 mmol/L    CO2 20 (L) 23 - 29 mmol/L    Glucose 81 70 - 110 mg/dL    BUN 16 6 - 20 mg/dL    Creatinine 0.8 0.5 - 1.4 mg/dL    Calcium 9.1 8.7 - 10.5 mg/dL    Protein Total 7.2 6.0 - 8.4 gm/dL    Albumin 3.8 3.5 - 5.2 g/dL    Bilirubin Total 0.9 0.1 - 1.0 mg/dL    ALP 58 40 - 150 unit/L    AST 16 11 - 45 unit/L    ALT 18 10 - 44 unit/L    Anion Gap 8 8 - 16 mmol/L    eGFR >60 >60 mL/min/1.73/m2   CBC with Differential    Collection Time: 04/06/25  1:31 PM   Result Value Ref Range    WBC 7.37 3.90 - 12.70 K/uL    RBC 4.15 4.00 - 5.40 M/uL    HGB 12.3 12.0 - 16.0 gm/dL    HCT 35.4 (L) 37.0 - 48.5 %    MCV 85 82 - 98 fL    MCH 29.6 27.0 - 31.0 pg    MCHC 34.7 32.0 - 36.0 g/dL    RDW 12.3 11.5 - 14.5 %    Platelet Count 247 150 - 450 K/uL    MPV 10.9 9.2 - 12.9 fL    Nucleated RBC 0 <=0 /100 WBC    Neut % 63.9 38 - 73 %    Lymph % 24.0 18 - 48 %    Mono % 8.3 4 - 15 %    Eos % 3.0 <=8 %    Basophil % 0.5 <=1.9 %    Imm Grans % 0.3 0.0 - 0.5 %    Neut # 4.71 1.8  - 7.7 K/uL    Lymph # 1.77 1 - 4.8 K/uL    Mono # 0.61 0.3 - 1 K/uL    Eos # 0.22 <=0.5 K/uL    Baso # 0.04 <=0.2 K/uL    Imm Grans # 0.02 0.00 - 0.04 K/uL   Influenza A & B by Molecular    Collection Time: 04/06/25  1:33 PM    Specimen: Nasal Swab   Result Value Ref Range    INFLUENZA A MOLECULAR Negative Negative    INFLUENZA B MOLECULAR  Negative Negative   Group A Strep, Molecular    Collection Time: 04/06/25  1:33 PM    Specimen: Throat   Result Value Ref Range    Group A Strep Molecular Negative Negative   COVID-19 Rapid Screening    Collection Time: 04/06/25  1:33 PM   Result Value Ref Range    SARS COV-2 Molecular Negative Negative       Imaging Results:  Imaging Results    None                 The Emergency Provider reviewed the vital signs and test results, which are outlined above.     ED Discussion     2:43 PM: Reassessed pt at this time. Discussed with patient and/or family/caretaker all pertinent ED information and results. Discussed pt dx and plan of tx. Gave the patient all f/u and return to the ED instructions. All questions and concerns were addressed at this time. Patient and/or family/caretaker expresses understanding of information and instructions, and is comfortable with plan to discharge. Pt is stable for discharge.     I discussed with patient and/or family/caretaker that evaluation in the ED does not suggest any emergent or life threatening medical conditions requiring immediate intervention beyond what was provided in the ED, and I believe patient is safe for discharge.  Regardless, an unremarkable evaluation in the ED does not preclude the development or presence of a serious of life threatening condition. As such, I instructed that the patient is to return immediately for any worsening or change in current symptoms.         Medical Decision Making  DDX: 1. Strep pharyngitis 2. Viral URI 3. Bronchitis 4. Laryngitis     VSS, non-toxic appearing, flu, covid and strep negative, patient given  iv fluids, iv steroids, refused toradol, she is otherwise stable for discharge.     Amount and/or Complexity of Data Reviewed  Labs: ordered. Decision-making details documented in ED Course.    Risk  Prescription drug management.    b            ED Medication(s):  Medications   sodium chloride 0.9% bolus 1,000 mL 1,000 mL (0 mLs Intravenous Stopped 4/6/25 1513)   methylPREDNISolone sodium succinate injection 125 mg (125 mg Intravenous Given 4/6/25 1352)       Discharge Medication List as of 4/6/2025  2:43 PM           Follow-up Information       [facility]. Schedule an appointment as soon as possible for a visit in 2 days.    Why: Return to the Emergency Room, If symptoms worsen  Contact information:  [address] [facility] [id number] [phone]                                 Scribe Attestation:   Scribe #1: I performed the above scribed service and the documentation accurately describes the services I performed. I attest to the accuracy of the note.     Attending:   Physician Attestation Statement for Scribe #1: I, Beatriz Barrera MD, personally performed the services described in this documentation, as scribed by Maya Brennan, in my presence, and it is both accurate and complete.           Clinical Impression       ICD-10-CM ICD-9-CM   1. Laryngitis  J04.0 464.00   2. Viral syndrome  B34.9 079.99       Disposition:   Disposition: Discharged  Condition: Stable        Beatriz Barrera MD  04/09/25 3415

## 2025-04-25 ENCOUNTER — HOSPITAL ENCOUNTER (EMERGENCY)
Facility: HOSPITAL | Age: 25
Discharge: HOME | End: 2025-04-25
Attending: EMERGENCY MEDICINE
Payer: MEDICAID

## 2025-04-25 VITALS
SYSTOLIC BLOOD PRESSURE: 118 MMHG | RESPIRATION RATE: 19 BRPM | OXYGEN SATURATION: 100 % | BODY MASS INDEX: 21.61 KG/M2 | DIASTOLIC BLOOD PRESSURE: 68 MMHG | HEART RATE: 89 BPM | WEIGHT: 107 LBS | TEMPERATURE: 98 F

## 2025-04-25 DIAGNOSIS — J02.0 STREP PHARYNGITIS: Primary | ICD-10-CM

## 2025-04-25 LAB — GROUP A STREP MOLECULAR (OHS): POSITIVE

## 2025-04-25 PROCEDURE — 87651 STREP A DNA AMP PROBE: CPT | Performed by: NURSE PRACTITIONER

## 2025-04-25 PROCEDURE — 25000003 PHARM REV CODE 250: Performed by: NURSE PRACTITIONER

## 2025-04-25 PROCEDURE — 99284 EMERGENCY DEPT VISIT MOD MDM: CPT

## 2025-04-25 RX ORDER — HYDROCODONE BITARTRATE AND ACETAMINOPHEN 10; 325 MG/1; MG/1
1 TABLET ORAL
Refills: 0 | Status: COMPLETED | OUTPATIENT
Start: 2025-04-25 | End: 2025-04-25

## 2025-04-25 RX ORDER — CLINDAMYCIN HYDROCHLORIDE 150 MG/1
300 CAPSULE ORAL 4 TIMES DAILY
Qty: 40 CAPSULE | Refills: 0 | Status: SHIPPED | OUTPATIENT
Start: 2025-04-25 | End: 2025-04-30

## 2025-04-25 RX ORDER — HYDROCODONE BITARTRATE AND ACETAMINOPHEN 7.5; 325 MG/1; MG/1
1 TABLET ORAL EVERY 6 HOURS PRN
Qty: 11 TABLET | Refills: 0 | Status: SHIPPED | OUTPATIENT
Start: 2025-04-25 | End: 2025-05-05

## 2025-04-25 RX ORDER — HYDROXYZINE PAMOATE 25 MG/1
50 CAPSULE ORAL
Status: DISCONTINUED | OUTPATIENT
Start: 2025-04-25 | End: 2025-04-25 | Stop reason: HOSPADM

## 2025-04-25 RX ADMIN — HYDROCODONE BITARTRATE AND ACETAMINOPHEN 1 TABLET: 10; 325 TABLET ORAL at 12:04

## 2025-04-25 NOTE — ED PROVIDER NOTES
Encounter Date: 4/25/2025       History     Chief Complaint   Patient presents with    Sore Throat     Pt was recently diagnosed with strep throat.  Two days ago, she began to have throbbing throat pain, body pain, and headache.  She c/co pain with eating and drinking.   She was seen at urgent care yesterday for this complaint; her symptoms are not resolved by antibiotics and pain medications.     Patient presents the ED with complaints that she has had 3 months of throat pain has been on several rounds of antibiotics some steroids and numerous other pain medications with no relief.  States that she has a ENT appointment on the 16th.        Review of patient's allergies indicates:   Allergen Reactions    Fluconazole Anaphylaxis and Hives    Metoclopramide hcl     Latex Itching and Other (See Comments)     Redness    Prednisone Palpitations     Past Medical History:   Diagnosis Date    ADHD (attention deficit hyperactivity disorder)     Anxiety     Anxiety state 11/14/2016 8:45:01 AM    [facility]: Anxiety-No Additional Notes    Anxiety state 11/14/2016 8:45:01 AM    [facility]: Anxiety-No Additional Notes    Calculus of kidney 8/20/2018 11:46:04 AM    [facility] - Unknown: Kidney stones, calcium oxalate-No Additional Notes    Calculus of kidney 8/20/2018 11:46:04 AM    [facility] - Unknown: Kidney stones, calcium oxalate-No Additional Notes    Depression     History of chlamydia     Infection of kidney 8/20/2018 11:46:21 AM    [facility]: Kidney Infection-No Additional Notes    Infection of kidney 8/20/2018 11:46:21 AM    [facility]: Kidney Infection-No Additional Notes    Lactose intolerance     Migraine headache     Other and unspecified ovarian cyst 6/27/2016 12:55:46 PM    [facility]: Ovarian Cyst-No Additional Notes    Other and unspecified ovarian cyst 6/27/2016 12:55:46 PM    [facility] - [place]: Ovarian Cyst-No Additional Notes    Other depressive disorder 11/14/2016 8:45:02 AM    [facility] - [place]: Depression-No Additional Notes    Other depressive disorder 11/14/2016 8:45:02 AM    [facility]: Depression-No Additional Notes    Renal disorder     kidney stones    TIA (transient ischemic attack)     Unspecified chlamydial infection, in conditions classified elsewhere and of unspecified site 9/28/2017 3:59:08 PM    [facility] - [facility]: Chlamydia-No Additional Notes    Unspecified chlamydial infection, in conditions classified elsewhere and of unspecified site 9/28/2017 3:59:08 PM    [facility]: Chlamydia-No Additional Notes    Urinary tract infection 8/20/2018 11:46:40 AM    [facility] - Urology: Urinary Tract Infection-No Additional Notes    Urinary tract infection 8/20/2018 11:46:40 AM    [facility] - Urology: Urinary Tract Infection-No Additional Notes     Past Surgical History:   Procedure Laterality Date    blood vessel removed from chest      BREAST SURGERY  03/22/2019    kidney stent      LIPOSUCTION      Mirena      placed 2 years ago     Family History   Problem Relation Name Age of Onset    Breast cancer Paternal Grandmother      Irritable bowel syndrome Mother      Breast cancer Paternal Aunt x2     Colon cancer Neg Hx      Ovarian cancer Neg Hx       Social History[1]  Review of Systems   Constitutional:  Negative for fever.   HENT:  Positive for sore throat.    Respiratory:  Negative for shortness of breath.    Cardiovascular:  Negative for chest pain.   Gastrointestinal:  Negative for nausea.   Genitourinary:  Negative for dysuria.   Musculoskeletal:  Negative for back pain.   Skin:  Negative for rash.   Neurological:  Negative for weakness.   Hematological:  Does not bruise/bleed easily.       Physical Exam     Initial Vitals [04/25/25 1028]   BP Pulse Resp Temp SpO2   113/70 96  18 98.1 °F (36.7 °C) 98 %      MAP       --         Physical Exam    Nursing note and vitals reviewed.  Constitutional: She appears well-developed and well-nourished. She is not diaphoretic. She is active.  Non-toxic appearance. No distress.   Pain distress   HENT:   Head: Normocephalic and atraumatic.   Right Ear: External ear normal.   Left Ear: External ear normal. Mouth/Throat: No oropharyngeal exudate.   Moderate posterior pharynx erythema, tonsils unremarkable equal no exudate, uvula midline.  No difficulty rotating of the neck.  No external neck edema.  No trismus.  Patient is speaking clearly in full sentences   Eyes: Conjunctivae are normal. Right eye exhibits no discharge. Left eye exhibits no discharge. No scleral icterus.   Neck: Neck supple. No tracheal deviation present.   Normal range of motion.  Cardiovascular:  Normal rate, regular rhythm and intact distal pulses.           No murmur heard.  Pulmonary/Chest: Breath sounds normal. No respiratory distress. She has no wheezes.   Abdominal: She exhibits no distension.   Musculoskeletal:         General: No tenderness. Normal range of motion.      Cervical back: Normal range of motion and neck supple.     Lymphadenopathy:     She has no cervical adenopathy.   Neurological: She is alert and oriented to person, place, and time. No cranial nerve deficit. GCS score is 15. GCS eye subscore is 4. GCS verbal subscore is 5. GCS motor subscore is 6.   Skin: Skin is warm and dry. Capillary refill takes less than 2 seconds. No rash noted.   Psychiatric: She has a normal mood and affect. Her behavior is normal. Judgment and thought content normal.   Anxious         ED Course   Procedures  Labs Reviewed   GROUP A STREP, MOLECULAR - Abnormal       Result Value    Group A Strep Molecular Positive (*)     Narrative:     Arcanobacterium haemolyticum and Beta Streptococcus group C and G will not be detected by this test method.  Please order Throat Culture ([id number]) if  suspected.              Imaging Results    None          Medications   HYDROcodone-acetaminophen  mg per tablet 1 tablet (1 tablet Oral Given 4/25/25 1256)     Medical Decision Making  Differential diagnosis considered but not limited to; viral pharyngitis, strep pharyngitis, laryngitis, anxiety.  I spoke to patient at length initially during my HPI.  Patient was very agitated from the beginning of my introduction.  Patient distraught about having throat pain for 3 months with no clear diagnosis.   she has been on antibiotics numerous time has had steroids.  today was given her mother's prescription narcotic pain medicine which did not help her.  States that she has not gotten any better.  She is also complaining of an itching sensation from her neck to her ears.  Discussed the possibility that there is a household allergen causing chronic allergic URI symptoms.  I ordered a Vistaril which I thought would help both with her anxiety here in the department and the itching sensation and may help subside the throat discomfort.  Patient refused this medication and was offended that I would prescribe this.  We did receive positive strep test on the patient and when I returned to discussed treatment options patient was irate. I listened openly as she accused me of being non compassionate, not taking her seriously.  Saying that I thought nothing was wrong with her despite my saying that her throat was very red and warranted a strep swab and offering several pharmacological interventions.     At the end of the patient's diatribe about how previous providers have not fix this problem, she did give her recommendations on what she would like me to do.  We will start her on clindamycin and prescribe her opioid pain medications.  I placed a referral for ENT services to see if they could possibly see the patient before her appointment on the 16th.    Risk  Prescription drug management.                                       Clinical Impression:  Final diagnoses:  [J02.0] Strep pharyngitis (Primary)          ED Disposition Condition    Discharge Stable          ED Prescriptions       Medication Sig Dispense Start Date End Date Auth. Provider    clindamycin (CLEOCIN) 150 MG capsule Take 2 capsules (300 mg total) by mouth 4 (four) times daily. for 5 days 40 capsule 4/25/2025 4/30/2025 Jose Daniel Cutler NP    HYDROcodone-acetaminophen (NORCO) 7.5-325 mg per tablet Take 1 tablet by mouth every 6 (six) hours as needed. 11 tablet 4/25/2025 5/5/2025 Jose Daniel Cutler NP          Follow-up Information       Follow up With Specialties Details Why Contact Info    Otolaryngology Otolaryngology Schedule an appointment as soon as possible for a visit today  [id number] [place] [id number]  [phone]               [1]   Social History  Tobacco Use    Smoking status: Some Days     Current packs/day: 0.50     Types: Vaping with nicotine, Cigarettes    Smokeless tobacco: Current   Substance Use Topics    Alcohol use: No    Drug use: No        Jose Daniel Cutler NP  04/27/25 0836

## 2025-04-25 NOTE — Clinical Note
"Ruslan Sheppardjalil" Chris was seen and treated in our emergency department on 4/25/2025.  She may return to work on 04/28/2025.       If you have any questions or concerns, please don't hesitate to call.      Beatriz Barrera MD"

## 2025-08-25 ENCOUNTER — TELEPHONE (OUTPATIENT)
Dept: OBSTETRICS AND GYNECOLOGY | Facility: CLINIC | Age: 25
End: 2025-08-25
Payer: MEDICAID

## 2025-08-30 ENCOUNTER — HOSPITAL ENCOUNTER (EMERGENCY)
Facility: HOSPITAL | Age: 25
Discharge: HOME | End: 2025-08-30
Attending: EMERGENCY MEDICINE
Payer: COMMERCIAL

## 2025-08-30 VITALS
RESPIRATION RATE: 16 BRPM | OXYGEN SATURATION: 99 % | HEART RATE: 90 BPM | HEIGHT: 59 IN | BODY MASS INDEX: 19.93 KG/M2 | SYSTOLIC BLOOD PRESSURE: 125 MMHG | WEIGHT: 98.88 LBS | TEMPERATURE: 98 F | DIASTOLIC BLOOD PRESSURE: 72 MMHG

## 2025-08-30 DIAGNOSIS — G89.18 POST-OPERATIVE PAIN: Primary | ICD-10-CM

## 2025-08-30 PROCEDURE — 99284 EMERGENCY DEPT VISIT MOD MDM: CPT | Mod: 25

## 2025-08-30 PROCEDURE — 63600175 PHARM REV CODE 636 W HCPCS: Performed by: PHYSICIAN ASSISTANT

## 2025-08-30 PROCEDURE — 96372 THER/PROPH/DIAG INJ SC/IM: CPT | Performed by: PHYSICIAN ASSISTANT

## 2025-08-30 RX ORDER — ONDANSETRON HYDROCHLORIDE 2 MG/ML
4 INJECTION, SOLUTION INTRAVENOUS
Status: COMPLETED | OUTPATIENT
Start: 2025-08-30 | End: 2025-08-30

## 2025-08-30 RX ORDER — OXYCODONE AND ACETAMINOPHEN 10; 325 MG/1; MG/1
1 TABLET ORAL EVERY 6 HOURS PRN
Qty: 15 TABLET | Refills: 0 | Status: SHIPPED | OUTPATIENT
Start: 2025-08-30

## 2025-08-30 RX ORDER — MORPHINE SULFATE 4 MG/ML
8 INJECTION, SOLUTION INTRAMUSCULAR; INTRAVENOUS
Status: COMPLETED | OUTPATIENT
Start: 2025-08-30 | End: 2025-08-30

## 2025-08-30 RX ADMIN — ONDANSETRON 4 MG: 2 INJECTION INTRAMUSCULAR; INTRAVENOUS at 12:08

## 2025-08-30 RX ADMIN — MORPHINE SULFATE 8 MG: 4 INJECTION INTRAVENOUS at 12:08

## 2025-09-03 ENCOUNTER — OFFICE VISIT (OUTPATIENT)
Dept: OBSTETRICS AND GYNECOLOGY | Facility: CLINIC | Age: 25
End: 2025-09-03
Payer: COMMERCIAL

## 2025-09-03 VITALS
DIASTOLIC BLOOD PRESSURE: 62 MMHG | BODY MASS INDEX: 20.13 KG/M2 | SYSTOLIC BLOOD PRESSURE: 100 MMHG | WEIGHT: 99.88 LBS | HEIGHT: 59 IN

## 2025-09-03 DIAGNOSIS — Z11.3 SCREENING FOR STD (SEXUALLY TRANSMITTED DISEASE): ICD-10-CM

## 2025-09-03 DIAGNOSIS — Z01.419 ENCOUNTER FOR GYNECOLOGICAL EXAMINATION WITHOUT ABNORMAL FINDING: Primary | ICD-10-CM

## 2025-09-03 DIAGNOSIS — N77.1 VAGINITIS, VULVITIS AND VULVOVAGINITIS IN DISEASES CLASSIFIED ELSEWHERE: ICD-10-CM

## 2025-09-03 LAB
GARDNERELLA VAGINALIS: NEGATIVE
OTHER MICROSC. OBSERVATIONS: NORMAL
POC BACTERIAL VAGINOSIS: NEGATIVE
POC CLUE CELLS: NEGATIVE
TRICHOMONAS, POC: NEGATIVE
YEAST WET PREP: NEGATIVE

## 2025-09-03 PROCEDURE — 99999 PR PBB SHADOW E&M-EST. PATIENT-LVL IV: CPT | Mod: PBBFAC,,, | Performed by: NURSE PRACTITIONER

## 2025-09-03 RX ORDER — CLOTRIMAZOLE AND BETAMETHASONE DIPROPIONATE 10; .64 MG/G; MG/G
CREAM TOPICAL 2 TIMES DAILY
Qty: 45 G | Refills: 1 | Status: SHIPPED | OUTPATIENT
Start: 2025-09-03

## 2025-09-03 RX ORDER — IBUPROFEN 400 MG/1
400 TABLET, FILM COATED ORAL EVERY 6 HOURS
COMMUNITY
Start: 2025-08-22

## 2025-09-03 RX ORDER — ZOLPIDEM TARTRATE 5 MG/1
5 TABLET ORAL NIGHTLY
COMMUNITY
Start: 2025-03-17

## 2025-09-03 RX ORDER — AMOXICILLIN 250 MG
1 CAPSULE ORAL
COMMUNITY
Start: 2025-08-25 | End: 2026-08-25

## 2025-09-03 RX ORDER — CLONAZEPAM 2 MG/1
2 TABLET ORAL
COMMUNITY